# Patient Record
Sex: FEMALE | Race: WHITE | NOT HISPANIC OR LATINO
[De-identification: names, ages, dates, MRNs, and addresses within clinical notes are randomized per-mention and may not be internally consistent; named-entity substitution may affect disease eponyms.]

---

## 2017-04-08 ENCOUNTER — RX RENEWAL (OUTPATIENT)
Age: 82
End: 2017-04-08

## 2017-04-10 ENCOUNTER — APPOINTMENT (OUTPATIENT)
Dept: PHARMACY | Facility: CLINIC | Age: 82
End: 2017-04-10

## 2017-04-10 ENCOUNTER — APPOINTMENT (OUTPATIENT)
Dept: OTOLARYNGOLOGY | Facility: CLINIC | Age: 82
End: 2017-04-10

## 2017-04-20 ENCOUNTER — CLINICAL ADVICE (OUTPATIENT)
Age: 82
End: 2017-04-20

## 2017-06-16 ENCOUNTER — APPOINTMENT (OUTPATIENT)
Dept: INTERNAL MEDICINE | Facility: CLINIC | Age: 82
End: 2017-06-16

## 2017-06-16 ENCOUNTER — NON-APPOINTMENT (OUTPATIENT)
Age: 82
End: 2017-06-16

## 2017-06-16 VITALS
BODY MASS INDEX: 35.88 KG/M2 | DIASTOLIC BLOOD PRESSURE: 88 MMHG | HEART RATE: 77 BPM | SYSTOLIC BLOOD PRESSURE: 160 MMHG | WEIGHT: 195 LBS | TEMPERATURE: 97.8 F | OXYGEN SATURATION: 96 % | HEIGHT: 62 IN

## 2017-06-16 VITALS — DIASTOLIC BLOOD PRESSURE: 80 MMHG | SYSTOLIC BLOOD PRESSURE: 146 MMHG

## 2017-06-16 DIAGNOSIS — M25.519 PAIN IN UNSPECIFIED SHOULDER: ICD-10-CM

## 2017-06-16 DIAGNOSIS — F32.9 MAJOR DEPRESSIVE DISORDER, SINGLE EPISODE, UNSPECIFIED: ICD-10-CM

## 2017-06-16 LAB
CREAT SPEC-SCNC: NORMAL
GLUCOSE UR-MCNC: NORMAL
HGB UR QL STRIP.AUTO: NORMAL
KETONES UR-MCNC: NORMAL
LEUKOCYTE ESTERASE UR QL STRIP: NORMAL
NITRITE UR QL STRIP: NORMAL
PH UR STRIP: 7
PROT UR STRIP-MCNC: NORMAL
SP GR UR STRIP: 1.01

## 2017-06-26 ENCOUNTER — APPOINTMENT (OUTPATIENT)
Dept: PHARMACY | Facility: CLINIC | Age: 82
End: 2017-06-26

## 2017-07-01 LAB
25(OH)D3 SERPL-MCNC: 38.4 NG/ML
ALBUMIN SERPL ELPH-MCNC: 4.2 G/DL
ALP BLD-CCNC: 70 U/L
ALT SERPL-CCNC: 16 U/L
ANION GAP SERPL CALC-SCNC: 18 MMOL/L
AST SERPL-CCNC: 21 U/L
BASOPHILS # BLD AUTO: 0.05 K/UL
BASOPHILS NFR BLD AUTO: 0.6 %
BILIRUB SERPL-MCNC: 0.4 MG/DL
BUN SERPL-MCNC: 27 MG/DL
CALCIUM SERPL-MCNC: 9.7 MG/DL
CHLORIDE SERPL-SCNC: 104 MMOL/L
CHOLEST SERPL-MCNC: 398 MG/DL
CHOLEST/HDLC SERPL: 7.2 RATIO
CO2 SERPL-SCNC: 19 MMOL/L
CREAT SERPL-MCNC: 0.91 MG/DL
EOSINOPHIL # BLD AUTO: 0.15 K/UL
EOSINOPHIL NFR BLD AUTO: 1.9 %
GLUCOSE SERPL-MCNC: 95 MG/DL
HCT VFR BLD CALC: 44.3 %
HDLC SERPL-MCNC: 55 MG/DL
HGB BLD-MCNC: 13.8 G/DL
IMM GRANULOCYTES NFR BLD AUTO: 0 %
LDLC SERPL CALC-MCNC: 309 MG/DL
LYMPHOCYTES # BLD AUTO: 1.99 K/UL
LYMPHOCYTES NFR BLD AUTO: 25.6 %
MAN DIFF?: NORMAL
MCHC RBC-ENTMCNC: 30.1 PG
MCHC RBC-ENTMCNC: 31.2 GM/DL
MCV RBC AUTO: 96.7 FL
MONOCYTES # BLD AUTO: 0.56 K/UL
MONOCYTES NFR BLD AUTO: 7.2 %
NEUTROPHILS # BLD AUTO: 5.02 K/UL
NEUTROPHILS NFR BLD AUTO: 64.7 %
PLATELET # BLD AUTO: 264 K/UL
POTASSIUM SERPL-SCNC: 4.4 MMOL/L
PROT SERPL-MCNC: 7.6 G/DL
RBC # BLD: 4.58 M/UL
RBC # FLD: 15.1 %
SODIUM SERPL-SCNC: 141 MMOL/L
TRIGL SERPL-MCNC: 169 MG/DL
TSH SERPL-ACNC: 1.12 UIU/ML
WBC # FLD AUTO: 7.77 K/UL

## 2017-10-02 ENCOUNTER — APPOINTMENT (OUTPATIENT)
Dept: INTERNAL MEDICINE | Facility: CLINIC | Age: 82
End: 2017-10-02
Payer: MEDICARE

## 2017-10-02 PROCEDURE — G0008: CPT

## 2017-10-02 PROCEDURE — 90662 IIV NO PRSV INCREASED AG IM: CPT

## 2017-10-05 ENCOUNTER — APPOINTMENT (OUTPATIENT)
Dept: PHARMACY | Facility: CLINIC | Age: 82
End: 2017-10-05
Payer: SELF-PAY

## 2017-10-05 PROCEDURE — V5299C: CUSTOM

## 2017-12-18 ENCOUNTER — INPATIENT (INPATIENT)
Facility: HOSPITAL | Age: 82
LOS: 2 days | Discharge: NURSING, REHAB OR LONG-TERM CARE | DRG: 563 | End: 2017-12-21
Attending: HOSPITALIST | Admitting: HOSPITALIST
Payer: MEDICARE

## 2017-12-18 VITALS — RESPIRATION RATE: 19 BRPM | SYSTOLIC BLOOD PRESSURE: 156 MMHG | DIASTOLIC BLOOD PRESSURE: 74 MMHG | HEART RATE: 79 BPM

## 2017-12-18 DIAGNOSIS — M25.571 PAIN IN RIGHT ANKLE AND JOINTS OF RIGHT FOOT: ICD-10-CM

## 2017-12-18 DIAGNOSIS — R26.2 DIFFICULTY IN WALKING, NOT ELSEWHERE CLASSIFIED: ICD-10-CM
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
## 2017-12-18 DIAGNOSIS — I10 ESSENTIAL (PRIMARY) HYPERTENSION: ICD-10-CM

## 2017-12-18 DIAGNOSIS — H40.9 UNSPECIFIED GLAUCOMA: ICD-10-CM

## 2017-12-18 LAB
ALBUMIN SERPL ELPH-MCNC: 4 G/DL — SIGNIFICANT CHANGE UP (ref 3.3–5)
ALP SERPL-CCNC: 83 U/L — SIGNIFICANT CHANGE UP (ref 40–120)
ALT FLD-CCNC: 13 U/L RC — SIGNIFICANT CHANGE UP (ref 10–45)
ANION GAP SERPL CALC-SCNC: 13 MMOL/L — SIGNIFICANT CHANGE UP (ref 5–17)
APTT BLD: 40.4 SEC — HIGH (ref 27.5–37.4)
AST SERPL-CCNC: 19 U/L — SIGNIFICANT CHANGE UP (ref 10–40)
BASOPHILS # BLD AUTO: 0.1 K/UL — SIGNIFICANT CHANGE UP (ref 0–0.2)
BASOPHILS NFR BLD AUTO: 1 % — SIGNIFICANT CHANGE UP (ref 0–2)
BILIRUB SERPL-MCNC: 0.5 MG/DL — SIGNIFICANT CHANGE UP (ref 0.2–1.2)
BUN SERPL-MCNC: 27 MG/DL — HIGH (ref 7–23)
CALCIUM SERPL-MCNC: 9.6 MG/DL — SIGNIFICANT CHANGE UP (ref 8.4–10.5)
CHLORIDE SERPL-SCNC: 102 MMOL/L — SIGNIFICANT CHANGE UP (ref 96–108)
CO2 SERPL-SCNC: 26 MMOL/L — SIGNIFICANT CHANGE UP (ref 22–31)
CREAT SERPL-MCNC: 0.92 MG/DL — SIGNIFICANT CHANGE UP (ref 0.5–1.3)
EOSINOPHIL # BLD AUTO: 0.1 K/UL — SIGNIFICANT CHANGE UP (ref 0–0.5)
EOSINOPHIL NFR BLD AUTO: 1.6 % — SIGNIFICANT CHANGE UP (ref 0–6)
GLUCOSE SERPL-MCNC: 113 MG/DL — HIGH (ref 70–99)
HCT VFR BLD CALC: 45.6 % — HIGH (ref 34.5–45)
HGB BLD-MCNC: 15.2 G/DL — SIGNIFICANT CHANGE UP (ref 11.5–15.5)
INR BLD: 0.99 RATIO — SIGNIFICANT CHANGE UP (ref 0.88–1.16)
LYMPHOCYTES # BLD AUTO: 2 K/UL — SIGNIFICANT CHANGE UP (ref 1–3.3)
LYMPHOCYTES # BLD AUTO: 22 % — SIGNIFICANT CHANGE UP (ref 13–44)
MCHC RBC-ENTMCNC: 31.5 PG — SIGNIFICANT CHANGE UP (ref 27–34)
MCHC RBC-ENTMCNC: 33.3 GM/DL — SIGNIFICANT CHANGE UP (ref 32–36)
MCV RBC AUTO: 94.4 FL — SIGNIFICANT CHANGE UP (ref 80–100)
MONOCYTES # BLD AUTO: 0.9 K/UL — SIGNIFICANT CHANGE UP (ref 0–0.9)
MONOCYTES NFR BLD AUTO: 9.6 % — SIGNIFICANT CHANGE UP (ref 2–14)
NEUTROPHILS # BLD AUTO: 5.8 K/UL — SIGNIFICANT CHANGE UP (ref 1.8–7.4)
NEUTROPHILS NFR BLD AUTO: 65.8 % — SIGNIFICANT CHANGE UP (ref 43–77)
PLATELET # BLD AUTO: 234 K/UL — SIGNIFICANT CHANGE UP (ref 150–400)
POTASSIUM SERPL-MCNC: 4.2 MMOL/L — SIGNIFICANT CHANGE UP (ref 3.5–5.3)
POTASSIUM SERPL-SCNC: 4.2 MMOL/L — SIGNIFICANT CHANGE UP (ref 3.5–5.3)
PROT SERPL-MCNC: 7.8 G/DL — SIGNIFICANT CHANGE UP (ref 6–8.3)
PROTHROM AB SERPL-ACNC: 10.8 SEC — SIGNIFICANT CHANGE UP (ref 9.8–12.7)
RBC # BLD: 4.84 M/UL — SIGNIFICANT CHANGE UP (ref 3.8–5.2)
RBC # FLD: 12.1 % — SIGNIFICANT CHANGE UP (ref 10.3–14.5)
SODIUM SERPL-SCNC: 141 MMOL/L — SIGNIFICANT CHANGE UP (ref 135–145)
WBC # BLD: 8.9 K/UL — SIGNIFICANT CHANGE UP (ref 3.8–10.5)
WBC # FLD AUTO: 8.9 K/UL — SIGNIFICANT CHANGE UP (ref 3.8–10.5)

## 2017-12-18 PROCEDURE — 72170 X-RAY EXAM OF PELVIS: CPT | Mod: 26

## 2017-12-18 PROCEDURE — 73560 X-RAY EXAM OF KNEE 1 OR 2: CPT | Mod: 26,LT

## 2017-12-18 PROCEDURE — 99285 EMERGENCY DEPT VISIT HI MDM: CPT | Mod: GC

## 2017-12-18 PROCEDURE — 72125 CT NECK SPINE W/O DYE: CPT | Mod: 26

## 2017-12-18 PROCEDURE — 99223 1ST HOSP IP/OBS HIGH 75: CPT

## 2017-12-18 PROCEDURE — 73590 X-RAY EXAM OF LOWER LEG: CPT | Mod: 26,RT

## 2017-12-18 PROCEDURE — 70450 CT HEAD/BRAIN W/O DYE: CPT | Mod: 26

## 2017-12-18 PROCEDURE — 73610 X-RAY EXAM OF ANKLE: CPT | Mod: 26,LT

## 2017-12-18 RX ORDER — ASPIRIN/CALCIUM CARB/MAGNESIUM 324 MG
1 TABLET ORAL
Qty: 0 | Refills: 0 | COMMUNITY

## 2017-12-18 RX ORDER — KETOROLAC TROMETHAMINE 30 MG/ML
10 SYRINGE (ML) INJECTION EVERY 8 HOURS
Qty: 0 | Refills: 0 | Status: DISCONTINUED | OUTPATIENT
Start: 2017-12-18 | End: 2017-12-21

## 2017-12-18 RX ORDER — TAFLUPROST 0 MG/.3ML
1 SOLUTION/ DROPS OPHTHALMIC
Qty: 0 | Refills: 0 | COMMUNITY

## 2017-12-18 RX ORDER — PILOCARPINE HCL 4 %
2 DROPS OPHTHALMIC (EYE) DAILY
Qty: 0 | Refills: 0 | Status: DISCONTINUED | OUTPATIENT
Start: 2017-12-18 | End: 2017-12-18

## 2017-12-18 RX ORDER — METOPROLOL TARTRATE 50 MG
50 TABLET ORAL DAILY
Qty: 0 | Refills: 0 | Status: DISCONTINUED | OUTPATIENT
Start: 2017-12-18 | End: 2017-12-21

## 2017-12-18 RX ORDER — IBUPROFEN 200 MG
400 TABLET ORAL EVERY 6 HOURS
Qty: 0 | Refills: 0 | Status: DISCONTINUED | OUTPATIENT
Start: 2017-12-18 | End: 2017-12-21

## 2017-12-18 RX ORDER — ACETAMINOPHEN 500 MG
650 TABLET ORAL EVERY 6 HOURS
Qty: 0 | Refills: 0 | Status: DISCONTINUED | OUTPATIENT
Start: 2017-12-18 | End: 2017-12-21

## 2017-12-18 RX ORDER — DORZOLAMIDE HYDROCHLORIDE TIMOLOL MALEATE 20; 5 MG/ML; MG/ML
1 SOLUTION/ DROPS OPHTHALMIC
Qty: 0 | Refills: 0 | Status: DISCONTINUED | OUTPATIENT
Start: 2017-12-18 | End: 2017-12-21

## 2017-12-18 RX ORDER — PILOCARPINE HCL 4 %
1 DROPS OPHTHALMIC (EYE) THREE TIMES A DAY
Qty: 0 | Refills: 0 | Status: DISCONTINUED | OUTPATIENT
Start: 2017-12-18 | End: 2017-12-21

## 2017-12-18 RX ORDER — DORZOLAMIDE HYDROCHLORIDE TIMOLOL MALEATE 20; 5 MG/ML; MG/ML
2 SOLUTION/ DROPS OPHTHALMIC DAILY
Qty: 0 | Refills: 0 | Status: DISCONTINUED | OUTPATIENT
Start: 2017-12-18 | End: 2017-12-18

## 2017-12-18 RX ORDER — ENOXAPARIN SODIUM 100 MG/ML
40 INJECTION SUBCUTANEOUS EVERY 24 HOURS
Qty: 0 | Refills: 0 | Status: DISCONTINUED | OUTPATIENT
Start: 2017-12-18 | End: 2017-12-21

## 2017-12-18 RX ORDER — ASPIRIN/CALCIUM CARB/MAGNESIUM 324 MG
81 TABLET ORAL DAILY
Qty: 0 | Refills: 0 | Status: DISCONTINUED | OUTPATIENT
Start: 2017-12-18 | End: 2017-12-21

## 2017-12-18 RX ADMIN — ENOXAPARIN SODIUM 40 MILLIGRAM(S): 100 INJECTION SUBCUTANEOUS at 18:34

## 2017-12-18 RX ADMIN — Medication 1 DROP(S): at 21:39

## 2017-12-18 RX ADMIN — DORZOLAMIDE HYDROCHLORIDE TIMOLOL MALEATE 1 DROP(S): 20; 5 SOLUTION/ DROPS OPHTHALMIC at 18:35

## 2017-12-18 NOTE — ED ADULT NURSE NOTE - OBJECTIVE STATEMENT
1040 89 yr old WF brought to ER via ambulance on stretcher for further eval and tx of right ankle pain/unable to walk. s/p tripped and fell 2 pm yesterday while ambulating with walker. No LOC. denies chest pain, palp, SOB, HA or dizziness. A&Ox3. Pain with ROM right ankle +swelling.

## 2017-12-18 NOTE — ED PROVIDER NOTE - ATTENDING CONTRIBUTION TO CARE
I performed a history and physical exam of the patient and discussed their management with the fellow. reviewed the fellow's note and agree with the documented findings and plan of care. My medical decision making and observations are found above.

## 2017-12-18 NOTE — H&P ADULT - PROBLEM SELECTOR PLAN 1
Clinical history consistent with mechanical fall in bathroom while trying to pivot.   Fall precaution, PT eval Clinical history consistent with mechanical fall in bathroom while trying to pivot.   Fall precaution, PT eval.   Trauma work up neg for acute fracture or bleeding.

## 2017-12-18 NOTE — ED PROVIDER NOTE - OBJECTIVE STATEMENT
89 yo f with history of macular degeneration, arthiritis, from home walks with walker on aspirin presents after fall from standing. Patient reports being at home in the bathroom and trying to grab a rail but missing the rail, tripping and falling, twisting her ankle and hitting her head. Denies LOC. She reports that she hit her medic alert 89 yo f with history of macular degeneration, arthiritis, from home walks with walker on aspirin presents after fall from standing. Patient reports being at home in the bathroom and trying to grab a rail but missing the rail, tripping and falling, twisting her ankle and hitting her head. Denies LOC. She reports that she hit her medic alert before they brought her to the ED. She is complaining of right ankle pain/swelling. Denies neurological symptoms. Unable to bear weight after fall.

## 2017-12-18 NOTE — H&P ADULT - NSHPLABSRESULTS_GEN_ALL_CORE
15.2   8.9   )-----------( 234      ( 18 Dec 2017 11:07 )             45.6   12-18    141  |  102  |  27<H>  ----------------------------<  113<H>  4.2   |  26  |  0.92    Ca    9.6      18 Dec 2017 11:07    TPro  7.8  /  Alb  4.0  /  TBili  0.5  /  DBili  x   /  AST  19  /  ALT  13  /  AlkPhos  83  12-18  < from: Xray Ankle Complete 3 Views, Right (12.18.17 @ 11:53) >    Xray ankle:   IMPRESSION:  1.  No acute fracture or dislocation.  2.  Soft tissue swelling about the ankle, particularly overlying the   lateral malleolus.  3.  Severe osteoarthritis of the right knee.    < end of copied text >

## 2017-12-18 NOTE — ED PROVIDER NOTE - PROGRESS NOTE DETAILS
Radiography negative. Patient unable to walk. D/w hospitalist, patient to be admitted due to Inability to ambulate due to right ankle pain.

## 2017-12-18 NOTE — ED ADULT NURSE NOTE - ED STAT RN HANDOFF DETAILS 2
hand off given to oncoming RN Carlita Llanes RN. Pt awaiting dispo hand off given to oncoming RN Carlita Llanes RN. Pt awaiting bed. TBA

## 2017-12-18 NOTE — ED PROVIDER NOTE - MEDICAL DECISION MAKING DETAILS
Brian KING: 89 yo F here for fall from standing. Pt is coming from home, reports tripping and falling causing her to twist her right ankle. + hit head. No loc. Pt hit her med alert. No preceding chest pain, palpitations, headache, fever, chills, nausea, vomiting. exam: right ankle swollen, ttp lateral maleolus left knee: mild tenderness, no obvious swelling or deformity left ankle: swollen, mild diffuse ttp, pelvis stable - no tenderness a/p: s/p mechanical fall - r/o fx. Pt cannot ambulate. Will get labs, XR of chest, pelvis, left knee and ankle, right ankle, pain control and reassess.

## 2017-12-18 NOTE — H&P ADULT - PROBLEM SELECTOR PLAN 2
Imagings negative for acute fracture or bleed.  Suspect right ankle sprain/ soft issue injury.   NSAIDs as needed for pain control  PT evaL. Imagings negative for acute fracture or bleed.  Suspect right ankle sprain/ soft issue injury.   NSAIDs as needed for pain control. PT evaL.

## 2017-12-18 NOTE — H&P ADULT - HISTORY OF PRESENT ILLNESS
89 yo f with h/o OA, HTN, glaucoma, macular degeneration presented with fall at home with right ankle pain. Patient uses walker at home 89 yo f with h/o OA, HTN, glaucoma, macular degeneration presented with fall at home with right ankle pain since yesteraday. Pt was in the bathroom and was attempting to pivot around while reaching for her walker when it tipped over and she landed on to her right lower back and hip. Believe also may have twisted her right ankle as well. Patient was able to crawl out and was found by her daughter who resides with her. Pain and swelling had gotten worse since yesterday and decided to come in today due inability to ambulate. denies any dizziness or LOC. No pain from RLE while lying down. Lives at home with her daughter. no home aide. No fever or chills, cough, chest pain, palpitations, SOB. no urinary symptoms.

## 2017-12-19 ENCOUNTER — TRANSCRIPTION ENCOUNTER (OUTPATIENT)
Age: 82
End: 2017-12-19

## 2017-12-19 DIAGNOSIS — N30.90 CYSTITIS, UNSPECIFIED WITHOUT HEMATURIA: ICD-10-CM

## 2017-12-19 LAB
ANION GAP SERPL CALC-SCNC: 12 MMOL/L — SIGNIFICANT CHANGE UP (ref 5–17)
APPEARANCE UR: ABNORMAL
BACTERIA # UR AUTO: ABNORMAL /HPF
BASOPHILS # BLD AUTO: 0.04 K/UL — SIGNIFICANT CHANGE UP (ref 0–0.2)
BASOPHILS NFR BLD AUTO: 0.6 % — SIGNIFICANT CHANGE UP (ref 0–2)
BILIRUB UR-MCNC: NEGATIVE — SIGNIFICANT CHANGE UP
BUN SERPL-MCNC: 25 MG/DL — HIGH (ref 7–23)
CALCIUM SERPL-MCNC: 9.1 MG/DL — SIGNIFICANT CHANGE UP (ref 8.4–10.5)
CHLORIDE SERPL-SCNC: 106 MMOL/L — SIGNIFICANT CHANGE UP (ref 96–108)
CO2 SERPL-SCNC: 25 MMOL/L — SIGNIFICANT CHANGE UP (ref 22–31)
COLOR SPEC: YELLOW — SIGNIFICANT CHANGE UP
CREAT SERPL-MCNC: 0.72 MG/DL — SIGNIFICANT CHANGE UP (ref 0.5–1.3)
DIFF PNL FLD: NEGATIVE — SIGNIFICANT CHANGE UP
EOSINOPHIL # BLD AUTO: 0.19 K/UL — SIGNIFICANT CHANGE UP (ref 0–0.5)
EOSINOPHIL NFR BLD AUTO: 2.7 % — SIGNIFICANT CHANGE UP (ref 0–6)
GLUCOSE BLDC GLUCOMTR-MCNC: 102 MG/DL — HIGH (ref 70–99)
GLUCOSE SERPL-MCNC: 107 MG/DL — HIGH (ref 70–99)
GLUCOSE UR QL: NEGATIVE — SIGNIFICANT CHANGE UP
HCT VFR BLD CALC: 41.8 % — SIGNIFICANT CHANGE UP (ref 34.5–45)
HGB BLD-MCNC: 13.1 G/DL — SIGNIFICANT CHANGE UP (ref 11.5–15.5)
IMM GRANULOCYTES NFR BLD AUTO: 0.1 % — SIGNIFICANT CHANGE UP (ref 0–1.5)
KETONES UR-MCNC: NEGATIVE — SIGNIFICANT CHANGE UP
LEUKOCYTE ESTERASE UR-ACNC: ABNORMAL
LYMPHOCYTES # BLD AUTO: 2.73 K/UL — SIGNIFICANT CHANGE UP (ref 1–3.3)
LYMPHOCYTES # BLD AUTO: 38.7 % — SIGNIFICANT CHANGE UP (ref 13–44)
MCHC RBC-ENTMCNC: 29.7 PG — SIGNIFICANT CHANGE UP (ref 27–34)
MCHC RBC-ENTMCNC: 31.3 GM/DL — LOW (ref 32–36)
MCV RBC AUTO: 94.8 FL — SIGNIFICANT CHANGE UP (ref 80–100)
MONOCYTES # BLD AUTO: 0.78 K/UL — SIGNIFICANT CHANGE UP (ref 0–0.9)
MONOCYTES NFR BLD AUTO: 11.1 % — SIGNIFICANT CHANGE UP (ref 2–14)
NEUTROPHILS # BLD AUTO: 3.3 K/UL — SIGNIFICANT CHANGE UP (ref 1.8–7.4)
NEUTROPHILS NFR BLD AUTO: 46.8 % — SIGNIFICANT CHANGE UP (ref 43–77)
NITRITE UR-MCNC: NEGATIVE — SIGNIFICANT CHANGE UP
PH UR: 5.5 — SIGNIFICANT CHANGE UP (ref 5–8)
PLATELET # BLD AUTO: 216 K/UL — SIGNIFICANT CHANGE UP (ref 150–400)
POTASSIUM SERPL-MCNC: 3.6 MMOL/L — SIGNIFICANT CHANGE UP (ref 3.5–5.3)
POTASSIUM SERPL-SCNC: 3.6 MMOL/L — SIGNIFICANT CHANGE UP (ref 3.5–5.3)
PROT UR-MCNC: 30 MG/DL
RBC # BLD: 4.41 M/UL — SIGNIFICANT CHANGE UP (ref 3.8–5.2)
RBC # FLD: 14.2 % — SIGNIFICANT CHANGE UP (ref 10.3–14.5)
RBC CASTS # UR COMP ASSIST: SIGNIFICANT CHANGE UP /HPF (ref 0–2)
SODIUM SERPL-SCNC: 143 MMOL/L — SIGNIFICANT CHANGE UP (ref 135–145)
SP GR SPEC: 1.02 — SIGNIFICANT CHANGE UP (ref 1.01–1.02)
UROBILINOGEN FLD QL: NEGATIVE — SIGNIFICANT CHANGE UP
WBC # BLD: 7.05 K/UL — SIGNIFICANT CHANGE UP (ref 3.8–10.5)
WBC # FLD AUTO: 7.05 K/UL — SIGNIFICANT CHANGE UP (ref 3.8–10.5)
WBC UR QL: >50 /HPF (ref 0–5)

## 2017-12-19 PROCEDURE — 99232 SBSQ HOSP IP/OBS MODERATE 35: CPT

## 2017-12-19 RX ORDER — CHOLECALCIFEROL (VITAMIN D3) 125 MCG
1000 CAPSULE ORAL DAILY
Qty: 0 | Refills: 0 | Status: DISCONTINUED | OUTPATIENT
Start: 2017-12-19 | End: 2017-12-21

## 2017-12-19 RX ORDER — CEPHALEXIN 500 MG
500 CAPSULE ORAL EVERY 12 HOURS
Qty: 0 | Refills: 0 | Status: DISCONTINUED | OUTPATIENT
Start: 2017-12-19 | End: 2017-12-21

## 2017-12-19 RX ADMIN — Medication 1 DROP(S): at 05:42

## 2017-12-19 RX ADMIN — DORZOLAMIDE HYDROCHLORIDE TIMOLOL MALEATE 1 DROP(S): 20; 5 SOLUTION/ DROPS OPHTHALMIC at 05:42

## 2017-12-19 RX ADMIN — ENOXAPARIN SODIUM 40 MILLIGRAM(S): 100 INJECTION SUBCUTANEOUS at 17:34

## 2017-12-19 RX ADMIN — Medication 1 DROP(S): at 14:01

## 2017-12-19 RX ADMIN — Medication 1000 UNIT(S): at 13:59

## 2017-12-19 RX ADMIN — Medication 81 MILLIGRAM(S): at 14:03

## 2017-12-19 RX ADMIN — DORZOLAMIDE HYDROCHLORIDE TIMOLOL MALEATE 1 DROP(S): 20; 5 SOLUTION/ DROPS OPHTHALMIC at 17:40

## 2017-12-19 RX ADMIN — Medication 500 MILLIGRAM(S): at 17:34

## 2017-12-19 RX ADMIN — Medication 1 DROP(S): at 21:30

## 2017-12-19 RX ADMIN — Medication 50 MILLIGRAM(S): at 05:42

## 2017-12-19 NOTE — DISCHARGE NOTE ADULT - MEDICATION SUMMARY - MEDICATIONS TO CHANGE
I will SWITCH the dose or number of times a day I take the medications listed below when I get home from the hospital:    Cosopt PF 2%-0.5% ophthalmic solution  -- 2 drop(s) to each affected eye once a day    pilocarpine 2% ophthalmic solution  -- 3 drop(s) to each affected eye once a day

## 2017-12-19 NOTE — PHYSICAL THERAPY INITIAL EVALUATION ADULT - LIVES WITH, PROFILE
as per pt and daughter at bedside, pt lives with daughter in a private house, 3 steps to enter, no steps inside house. PTA, pt ambulatory with rolling walker (I). pt (I) with bed mobility, transfers, ambulation, dressing and feeding. pt requires supervision while getting into tub for shower, however showers (I). pt requires 1-2 person assist for stair negotiation (since 5 years ago). pt always have someone to assist with stair negotiation. pt owns rolling walker, rollator, commode, shower chair, grab bars, wheelchair (for community distances)

## 2017-12-19 NOTE — PROVIDER CONTACT NOTE (OTHER) - ASSESSMENT
per pt daughter, pt cannot use dorzolamide and pilocarpine as both contain preservatives, and will irritate patient's eyes. pt daughter brought home medications, need order for patient to use own medications. pt does not have order for home med zioptan eye drops.

## 2017-12-19 NOTE — PHYSICAL THERAPY INITIAL EVALUATION ADULT - PRECAUTIONS/LIMITATIONS, REHAB EVAL
Pain and swelling had gotten worse since yesterday and decided to come in today due inability to ambulate. CTH 12/18 (-). xray tibia/fibula Soft tissue swelling about the ankle, particularly overlying the lateral malleolus. Severe osteoarthritis of the right knee. no fx./fall precautions

## 2017-12-19 NOTE — DISCHARGE NOTE ADULT - CARE PLAN
Principal Discharge DX:	Fall, initial encounter  Secondary Diagnosis:	Cystitis Principal Discharge DX:	Fall, initial encounter  Goal:	fall precaution  Instructions for follow-up, activity and diet:	fall precaution  physical therapy  Secondary Diagnosis:	Cystitis  Instructions for follow-up, activity and diet:	complete antibiotic  Secondary Diagnosis:	Glaucoma of both eyes, unspecified glaucoma type  Instructions for follow-up, activity and diet:	continue eye drops  Secondary Diagnosis:	Essential hypertension  Instructions for follow-up, activity and diet:	continue medication

## 2017-12-19 NOTE — DISCHARGE NOTE ADULT - CARE PROVIDER_API CALL
Latricia Dalal (MD), Internal Medicine  [address] [address]  Phone: [phone]  Fax: [phone]

## 2017-12-19 NOTE — PHYSICAL THERAPY INITIAL EVALUATION ADULT - DISCHARGE DISPOSITION, PT EVAL
home w/ assist/home w/ home PT/Home with PT for functional/safety assessment, gait/endurance training, general strengthening and fall risk prevention. pt would require supervision/assist with all ADLs and functional activities upon DC.

## 2017-12-19 NOTE — PHYSICAL THERAPY INITIAL EVALUATION ADULT - GAIT DEVIATIONS NOTED, PT EVAL
decreased velocity of limb motion/decreased weight-shifting ability/decreased norberto/decreased step length/decreased stride length

## 2017-12-19 NOTE — DISCHARGE NOTE ADULT - MEDICATION SUMMARY - MEDICATIONS TO TAKE
I will START or STAY ON the medications listed below when I get home from the hospital:    acetaminophen 325 mg oral tablet  -- 2 tab(s) by mouth every 6 hours, As needed, Mild Pain (1 - 3)  -- Indication: For pain    aspirin 81 mg oral tablet  -- 1 tab(s) by mouth once a day  -- Indication: For Antiplatelet    enoxaparin  -- 40 milligram(s) subcutaneous once a day  -- Indication: For Acute right ankle pain    Toprol-XL 50 mg oral tablet, extended release  -- 1 tab(s) by mouth once a day  -- Indication: For Essential hypertension    cephalexin 500 mg oral capsule  -- 1 cap(s) by mouth every 12 hours for 3 days  -- Indication: For Antibiotic    Zioptan 0.0015% ophthalmic solution  -- 1 drop(s) to each affected eye once a day (in the evening)  -- Indication: For Glaucoma of both eyes, unspecified glaucoma type    Cosopt PF 2%-0.5% ophthalmic solution  -- 1 drop(s) to each affected eye 2 times a day  -- Indication: For Glaucoma of both eyes, unspecified glaucoma type    pilocarpine 2% ophthalmic solution  -- 1 drop(s) to each affected eye 3 times a day  -- Indication: For Glaucoma of both eyes, unspecified glaucoma type    cholecalciferol oral tablet  -- 1000 unit(s) by mouth once a day  -- Indication: For vitamin D supplement

## 2017-12-19 NOTE — PHYSICAL THERAPY INITIAL EVALUATION ADULT - PERTINENT HX OF CURRENT PROBLEM, REHAB EVAL
89yoF, hx OA, HTN, glaucoma, macular degeneration presented with fall at home with right ankle pain since yesteraday. Pt was in the bathroom and was attempting to pivot around while reaching for her walker when it tipped over and she landed on to her right lower back and hip. Believe also may have twisted her right ankle as well.

## 2017-12-19 NOTE — DISCHARGE NOTE ADULT - PLAN OF CARE
fall precaution fall precaution  physical therapy complete antibiotic continue eye drops continue medication

## 2017-12-19 NOTE — DISCHARGE NOTE ADULT - HOSPITAL COURSE
89 yo f with OA, HTN, macular generation presented with mechanical fall and right ankle pain. Imaging not consistent with fracture. Found to have uncomplicated cystitis. 89 yo f with OA, HTN, macular generation presented with mechanical fall and right ankle pain. Imaging not consistent with fracture. Found to have uncomplicated cystitis.  complete course of antibiotic with keflex fro 3 more days;  seen by physical therapy and advised rehab 89 yo f with OA, HTN, macular generation presented with mechanical fall and right ankle pain. Imaging not consistent with fracture. Found to have uncomplicated cystitis.  complete course of antibiotic with keflex for  3 more days;  seen by physical therapy and advised rehab 89 yo f with OA, HTN, macular generation presented with mechanical fall and right ankle pain. Imaging not consistent with fracture. Mechanical fall likely in the setting of gait instability, seen by physical therapy and advised rehab. Also found to have uncomplicated cystitis., to complete course of antibiotic with keflex for  3 more days. Stable for dc to rehab

## 2017-12-19 NOTE — PHYSICAL THERAPY INITIAL EVALUATION ADULT - ACTIVE RANGE OF MOTION EXAMINATION, REHAB EVAL
B shoulders limited due to arthritis/bilateral upper extremity Active ROM was WFL (within functional limits)/bilateral  lower extremity Active ROM was WFL (within functional limits)

## 2017-12-20 PROCEDURE — 99233 SBSQ HOSP IP/OBS HIGH 50: CPT

## 2017-12-20 RX ORDER — DORZOLAMIDE HYDROCHLORIDE TIMOLOL MALEATE 20; 5 MG/ML; MG/ML
1 SOLUTION/ DROPS OPHTHALMIC
Qty: 0 | Refills: 0 | COMMUNITY

## 2017-12-20 RX ORDER — PILOCARPINE HCL 4 %
3 DROPS OPHTHALMIC (EYE)
Qty: 0 | Refills: 0 | COMMUNITY

## 2017-12-20 RX ORDER — CEPHALEXIN 500 MG
1 CAPSULE ORAL
Qty: 0 | Refills: 0 | COMMUNITY
Start: 2017-12-20

## 2017-12-20 RX ORDER — ENOXAPARIN SODIUM 100 MG/ML
40 INJECTION SUBCUTANEOUS
Qty: 0 | Refills: 0 | COMMUNITY
Start: 2017-12-20

## 2017-12-20 RX ORDER — CHOLECALCIFEROL (VITAMIN D3) 125 MCG
1000 CAPSULE ORAL
Qty: 0 | Refills: 0 | COMMUNITY
Start: 2017-12-20

## 2017-12-20 RX ORDER — ACETAMINOPHEN 500 MG
2 TABLET ORAL
Qty: 0 | Refills: 0 | COMMUNITY
Start: 2017-12-20

## 2017-12-20 RX ORDER — PILOCARPINE HCL 4 %
1 DROPS OPHTHALMIC (EYE)
Qty: 0 | Refills: 0 | COMMUNITY

## 2017-12-20 RX ORDER — DORZOLAMIDE HYDROCHLORIDE TIMOLOL MALEATE 20; 5 MG/ML; MG/ML
2 SOLUTION/ DROPS OPHTHALMIC
Qty: 0 | Refills: 0 | COMMUNITY

## 2017-12-20 RX ADMIN — Medication 1 DROP(S): at 13:11

## 2017-12-20 RX ADMIN — Medication 500 MILLIGRAM(S): at 05:16

## 2017-12-20 RX ADMIN — DORZOLAMIDE HYDROCHLORIDE TIMOLOL MALEATE 1 DROP(S): 20; 5 SOLUTION/ DROPS OPHTHALMIC at 17:31

## 2017-12-20 RX ADMIN — Medication 1 DROP(S): at 05:33

## 2017-12-20 RX ADMIN — Medication 50 MILLIGRAM(S): at 05:16

## 2017-12-20 RX ADMIN — Medication 1000 UNIT(S): at 12:47

## 2017-12-20 RX ADMIN — Medication 500 MILLIGRAM(S): at 17:31

## 2017-12-20 RX ADMIN — Medication 1 DROP(S): at 21:31

## 2017-12-20 RX ADMIN — DORZOLAMIDE HYDROCHLORIDE TIMOLOL MALEATE 1 DROP(S): 20; 5 SOLUTION/ DROPS OPHTHALMIC at 05:17

## 2017-12-20 RX ADMIN — ENOXAPARIN SODIUM 40 MILLIGRAM(S): 100 INJECTION SUBCUTANEOUS at 18:12

## 2017-12-20 RX ADMIN — Medication 81 MILLIGRAM(S): at 12:47

## 2017-12-20 NOTE — PROGRESS NOTE ADULT - ATTENDING COMMENTS
Patient lives alone - medically cleared for d/c on 5 day course of antibiotics if urine culture positive.  Will d/w daughter to see if patient is safe at home alone - may need to hire aid to help with ADLs (patient with limited vision as well).  If safe for d/c home - will discharge today  Discharge time >35 minutes

## 2017-12-21 VITALS
OXYGEN SATURATION: 97 % | HEART RATE: 83 BPM | DIASTOLIC BLOOD PRESSURE: 89 MMHG | TEMPERATURE: 97 F | RESPIRATION RATE: 18 BRPM | SYSTOLIC BLOOD PRESSURE: 168 MMHG

## 2017-12-21 PROBLEM — M19.90 UNSPECIFIED OSTEOARTHRITIS, UNSPECIFIED SITE: Chronic | Status: ACTIVE | Noted: 2017-12-18

## 2017-12-21 PROCEDURE — 82962 GLUCOSE BLOOD TEST: CPT

## 2017-12-21 PROCEDURE — 85730 THROMBOPLASTIN TIME PARTIAL: CPT

## 2017-12-21 PROCEDURE — 99239 HOSP IP/OBS DSCHRG MGMT >30: CPT

## 2017-12-21 PROCEDURE — 73610 X-RAY EXAM OF ANKLE: CPT

## 2017-12-21 PROCEDURE — 87086 URINE CULTURE/COLONY COUNT: CPT

## 2017-12-21 PROCEDURE — 73590 X-RAY EXAM OF LOWER LEG: CPT

## 2017-12-21 PROCEDURE — 81001 URINALYSIS AUTO W/SCOPE: CPT

## 2017-12-21 PROCEDURE — 72125 CT NECK SPINE W/O DYE: CPT

## 2017-12-21 PROCEDURE — 72170 X-RAY EXAM OF PELVIS: CPT

## 2017-12-21 PROCEDURE — 97530 THERAPEUTIC ACTIVITIES: CPT

## 2017-12-21 PROCEDURE — 80053 COMPREHEN METABOLIC PANEL: CPT

## 2017-12-21 PROCEDURE — 97116 GAIT TRAINING THERAPY: CPT

## 2017-12-21 PROCEDURE — 97163 PT EVAL HIGH COMPLEX 45 MIN: CPT

## 2017-12-21 PROCEDURE — 85027 COMPLETE CBC AUTOMATED: CPT

## 2017-12-21 PROCEDURE — 70450 CT HEAD/BRAIN W/O DYE: CPT

## 2017-12-21 PROCEDURE — 80048 BASIC METABOLIC PNL TOTAL CA: CPT

## 2017-12-21 PROCEDURE — 87186 SC STD MICRODIL/AGAR DIL: CPT

## 2017-12-21 PROCEDURE — 85610 PROTHROMBIN TIME: CPT

## 2017-12-21 PROCEDURE — 73560 X-RAY EXAM OF KNEE 1 OR 2: CPT

## 2017-12-21 PROCEDURE — 99285 EMERGENCY DEPT VISIT HI MDM: CPT | Mod: 25

## 2017-12-21 RX ADMIN — Medication 500 MILLIGRAM(S): at 05:19

## 2017-12-21 RX ADMIN — Medication 1 DROP(S): at 13:11

## 2017-12-21 RX ADMIN — DORZOLAMIDE HYDROCHLORIDE TIMOLOL MALEATE 1 DROP(S): 20; 5 SOLUTION/ DROPS OPHTHALMIC at 05:19

## 2017-12-21 RX ADMIN — Medication 50 MILLIGRAM(S): at 05:19

## 2017-12-21 RX ADMIN — Medication 650 MILLIGRAM(S): at 13:42

## 2017-12-21 RX ADMIN — Medication 1000 UNIT(S): at 12:57

## 2017-12-21 RX ADMIN — Medication 1 DROP(S): at 05:19

## 2017-12-21 RX ADMIN — Medication 81 MILLIGRAM(S): at 12:57

## 2017-12-21 NOTE — PROGRESS NOTE ADULT - PROBLEM SELECTOR PLAN 4
Continue with Toprol XL  Check orthostatics.    Discharge planning pending PT evaluation, as early as today if cleared by PT.    Discharge Time: 35 minutes
Continue with Toprol XL
Continue with Toprol XL

## 2017-12-21 NOTE — PROGRESS NOTE ADULT - PROBLEM SELECTOR PLAN 2
Imaging negative for acute fracture or bleed.  Suspect right ankle sprain/ soft issue injury.   NSAIDs as needed for pain control. PT eval pending.
Imaging negative for acute fracture or bleed.  Suspect right ankle sprain/ soft issue injury.   tylenol as needed for pain, avoid NSAIDS given age and creatinine clearance
Imaging negative for acute fracture or bleed.  Suspect right ankle sprain/ soft issue injury.   tylenol as needed for pain, avoid NSAIDS given age and creatinine clearance

## 2017-12-21 NOTE — PROGRESS NOTE ADULT - ASSESSMENT
89 yo f with OA, HTN, macular generation presented with mechanical fall and right ankle pain. Imaging not consistent with fracture. Found to have uncomplicated cystitis.
89 yo f with OA, HTN, macular generation presented with mechanical fall and right ankle pain. Imaging not consistent with fracture. Found to have uncomplicated cystitis.
89 yo f with OA, HTN, macular generation presented with mechanical fall and right ankle pain. Imaging not consistent with fracture. Found to have uncomplicated cystitis.

## 2017-12-21 NOTE — PROGRESS NOTE ADULT - PROBLEM SELECTOR PLAN 1
Clinical history consistent with mechanical fall in bathroom while trying to pivot.   Fall precautions, PT eval.   Trauma work up neg for acute fracture or bleeding.  Add vitamin D supplementation.  Limit sedatives/narcotics.  Check orthostatics.
Clinical history consistent with mechanical fall in bathroom while trying to pivot.   Fall precautions, PT eval  Trauma work up neg for acute fracture or bleeding.  Added vitamin D supplementation.  Limit sedatives/narcotics.  Plan for discharge to [facility] today
Clinical history consistent with mechanical fall in bathroom while trying to pivot.   Fall precautions, PT eval.   Trauma work up neg for acute fracture or bleeding.  Added vitamin D supplementation.  Limit sedatives/narcotics.

## 2017-12-21 NOTE — PROGRESS NOTE ADULT - PROBLEM SELECTOR PLAN 3
May have predisposed to fall. Otherwise, asymptomatic.  -Hesitant to start macrobid or bactrim given advanced age.  -Will start keflex 500 mg PO BID x 7 day course (3 day course not approved for this medication)  -Urine culture sent, will follow-up to see if adjustment in regimen is necessary
May have predisposed to fall. Otherwise, asymptomatic.  -Hesitant to start macrobid or bactrim given advanced age.  -Will started on keflex 500 mg PO BID x 5 day course - pending urine culture  -Urine culture sent, will follow-up to see if adjustment in regimen is necessary
May have predisposed to fall. Otherwise, asymptomatic.  Urine culture with klebsiella   Complete course of keflex for total 7 days

## 2017-12-21 NOTE — PROGRESS NOTE ADULT - SUBJECTIVE AND OBJECTIVE BOX
Patient is a 89y old  Female who presents with a chief complaint of s/p fall Right ankle pain (19 Dec 2017 14:38)      SUBJECTIVE / OVERNIGHT EVENTS: No cp, sob, chills, abdominal pain, wants to be discharged     MEDICATIONS  (STANDING):  aspirin enteric coated 81 milliGRAM(s) Oral daily  cephalexin 500 milliGRAM(s) Oral every 12 hours  cholecalciferol 1000 Unit(s) Oral daily  dorzolamide 2%/timolol 0.5% Ophthalmic Solution 1 Drop(s) Both EYES two times a day  enoxaparin Injectable 40 milliGRAM(s) SubCutaneous every 24 hours  metoprolol succinate ER 50 milliGRAM(s) Oral daily  pilocarpine 2% Solution 1 Drop(s) Both EYES three times a day  Tafluprost (Zioptan) eye drops 1 Drop(s) 1 Drop(s) Both EYES at bedtime    MEDICATIONS  (PRN):  acetaminophen   Tablet 650 milliGRAM(s) Oral every 6 hours PRN For Temp greater than 38 C (100.4 F)  acetaminophen   Tablet. 650 milliGRAM(s) Oral every 6 hours PRN Mild Pain (1 - 3)  ibuprofen  Tablet 400 milliGRAM(s) Oral every 6 hours PRN moderate pain  ketorolac 10 milliGRAM(s) Oral every 8 hours PRN Severe Pain (7 - 10)        CAPILLARY BLOOD GLUCOSE        I&O's Summary    20 Dec 2017 07:01  -  21 Dec 2017 07:00  --------------------------------------------------------  IN: 240 mL / OUT: 650 mL / NET: -410 mL    21 Dec 2017 07:01  -  21 Dec 2017 13:26  --------------------------------------------------------  IN: 240 mL / OUT: 0 mL / NET: 240 mL        PHYSICAL EXAM:  GENERAL: NAD, well-developed  HEAD:  Atraumatic, Normocephalic  EYES: EOMI, PERRLA, conjunctiva and sclera clear  NECK: Supple, No JVD  CHEST/LUNG: Clear to auscultation bilaterally; No wheeze  HEART: Regular rate and rhythm; S1S2  ABDOMEN: Soft, Nontender, Nondistended; Bowel sounds present  EXTREMITIES:  2+ Peripheral Pulses, No clubbing, cyanosis, or edema  PSYCH: AAOx3  SKIN: No rashes or lesions    LABS:                    RADIOLOGY & ADDITIONAL TESTS:    Imaging Personally Reviewed:    Consultant(s) Notes Reviewed:      Care Discussed with Consultants/Other Providers:
Patient is a 89y old  Female who presents with a chief complaint of s/p fall Right ankle pain (19 Dec 2017 14:38)      SUBJECTIVE / OVERNIGHT EVENTS: Patient seen and examined at bedside - patient feeling better, ankle pain has resolved. Patient says she is ok to go home today.     ROS:  All other review of systems negative    Allergies    No Known Allergies    Intolerances        MEDICATIONS  (STANDING):  aspirin enteric coated 81 milliGRAM(s) Oral daily  cephalexin 500 milliGRAM(s) Oral every 12 hours  cholecalciferol 1000 Unit(s) Oral daily  dorzolamide 2%/timolol 0.5% Ophthalmic Solution 1 Drop(s) Both EYES two times a day  enoxaparin Injectable 40 milliGRAM(s) SubCutaneous every 24 hours  metoprolol succinate ER 50 milliGRAM(s) Oral daily  pilocarpine 2% Solution 1 Drop(s) Both EYES three times a day  Tafluprost (Zioptan) eye drops 1 Drop(s) 1 Drop(s) Both EYES at bedtime    MEDICATIONS  (PRN):  acetaminophen   Tablet 650 milliGRAM(s) Oral every 6 hours PRN For Temp greater than 38 C (100.4 F)  acetaminophen   Tablet. 650 milliGRAM(s) Oral every 6 hours PRN Mild Pain (1 - 3)  ibuprofen  Tablet 400 milliGRAM(s) Oral every 6 hours PRN moderate pain  ketorolac 10 milliGRAM(s) Oral every 8 hours PRN Severe Pain (7 - 10)      Vital Signs Last 24 Hrs  T(C): 36.7 (20 Dec 2017 04:44), Max: 36.7 (20 Dec 2017 04:44)  T(F): 98.1 (20 Dec 2017 04:44), Max: 98.1 (20 Dec 2017 04:44)  HR: 73 (20 Dec 2017 04:44) (71 - 78)  BP: 137/77 (20 Dec 2017 04:44) (137/77 - 144/85)  BP(mean): --  RR: 18 (20 Dec 2017 04:44) (18 - 18)  SpO2: 94% (20 Dec 2017 04:44) (93% - 96%)  CAPILLARY BLOOD GLUCOSE      POCT Blood Glucose.: 102 mg/dL (19 Dec 2017 21:10)    I&O's Summary    19 Dec 2017 07:01  -  20 Dec 2017 07:00  --------------------------------------------------------  IN: 580 mL / OUT: 100 mL / NET: 480 mL        PHYSICAL EXAM:  GENERAL: NAD, well-developed  HEAD:  Atraumatic, Normocephalic  EYES: EOMI, PERRLA, conjunctiva and sclera clear  NECK: Supple, No JVD  CHEST/LUNG: Clear to auscultation bilaterally; No wheeze  HEART: Regular rate and rhythm; No murmurs, rubs, or gallops  ABDOMEN: Soft, Nontender, Nondistended; Bowel sounds present  EXTREMITIES:  2+ Peripheral Pulses, No clubbing, cyanosis, or edema  NEUROLOGY: AAOx3, non-focal  PSYCH: calm  SKIN: Trace b/l LE edema, chronic in appearance. No clubbing  MSK: Right ankle with FROM on passive flexion/extension without pain. Patient able to get up from seated position without assistance twice without pain in ankle. Left shin with small area of eschar and surrounding redness, non-tender.    LABS:                        13.1   7.05  )-----------( 216      ( 19 Dec 2017 08:46 )             41.8     12-    143  |  106  |  25<H>  ----------------------------<  107<H>  3.6   |  25  |  0.72    Ca    9.1      19 Dec 2017 08:45    TPro  7.8  /  Alb  4.0  /  TBili  0.5  /  DBili  x   /  AST  19  /  ALT  13  /  AlkPhos  83  12-18    PT/INR - ( 18 Dec 2017 11:07 )   PT: 10.8 sec;   INR: 0.99 ratio         PTT - ( 18 Dec 2017 11:07 )  PTT:40.4 sec      Urinalysis Basic - ( 19 Dec 2017 10:55 )    Color: Yellow / Appearance: SL Turbid / S.025 / pH: x  Gluc: x / Ketone: Negative  / Bili: Negative / Urobili: Negative   Blood: x / Protein: 30 mg/dL / Nitrite: Negative   Leuk Esterase: Large / RBC: 0-2 /HPF / WBC >50 /HPF   Sq Epi: x / Non Sq Epi: x / Bacteria: Many /HPF        Case Discussed with Family:
[facility] Division of Hospital Medicine  Woo Perera MD  Pager ([facility]MAN[facility], 2O-5P): [phone]  Other Times:  [phone]    Patient is a 89y old  Female who presents with a chief complaint of s/p fall Right ankle pain (18 Dec 2017 19:25)      SUBJECTIVE / OVERNIGHT EVENTS: Feels OK. Still reports R ankle pain, but feels better than yesterday. No left ankle/foot pain. No fever/chills. Denies dysuria. Is not really interested in working with physical therapy and wants to go home.    MEDICATIONS  (STANDING):  aspirin enteric coated 81 milliGRAM(s) Oral daily  cephalexin 500 milliGRAM(s) Oral every 12 hours  dorzolamide 2%/timolol 0.5% Ophthalmic Solution 1 Drop(s) Both EYES two times a day  enoxaparin Injectable 40 milliGRAM(s) SubCutaneous every 24 hours  metoprolol succinate ER 50 milliGRAM(s) Oral daily  pilocarpine 2% Solution 1 Drop(s) Both EYES three times a day  Tafluprost (Zioptan) eye drops 1 Drop(s) 1 Drop(s) Both EYES at bedtime    MEDICATIONS  (PRN):  acetaminophen   Tablet 650 milliGRAM(s) Oral every 6 hours PRN For Temp greater than 38 C (100.4 F)  acetaminophen   Tablet. 650 milliGRAM(s) Oral every 6 hours PRN Mild Pain (1 - 3)  ibuprofen  Tablet 400 milliGRAM(s) Oral every 6 hours PRN moderate pain  ketorolac 10 milliGRAM(s) Oral every 8 hours PRN Severe Pain (7 - 10)      CAPILLARY BLOOD GLUCOSE        I&O's Summary    18 Dec 2017 07:01  -  19 Dec 2017 07:00  --------------------------------------------------------  IN: 120 mL / OUT: 530 mL / NET: -410 mL        PHYSICAL EXAM:  Vital Signs Last 24 Hrs  T(C): 36.5 (19 Dec 2017 11:39), Max: 36.8 (18 Dec 2017 13:15)  T(F): 97.7 (19 Dec 2017 11:39), Max: 98.3 (18 Dec 2017 13:15)  HR: 71 (19 Dec 2017 11:39) (60 - 83)  BP: 141/81 (19 Dec 2017 11:39) (122/69 - 182/94)  BP(mean): 111 (18 Dec 2017 16:34) (111 - 111)  RR: 18 (19 Dec 2017 11:39) (18 - 20)  SpO2: 96% (19 Dec 2017 11:39) (92% - 98%)  GENERAL: NAD, well-developed. Smell of foul urine noted.  HEAD:  Atraumatic, Normocephalic  EYES: EOMI, PERRLA, conjunctiva and sclera clear  NECK: Supple, No JVD  CHEST/LUNG: Clear to auscultation bilaterally; No wheeze  HEART: Regular rate and rhythm; No murmurs, rubs, or gallops.   ABDOMEN: Soft, Nontender, Nondistended; Bowel sounds present. No suprapubic tenderness.  EXTREMITIES:  Trace b/l LE edema, chronic in appearance. No clubbing  MSK: Right ankle with FROM on passive flexion/extension without pain. Patient able to get up from seated position without assistance twice without pain in ankle. Left shin with small area of eschar and surrounding redness, non-tender.  PSYCH: AAOx3  NEUROLOGY: non-focal      LABS:                        13.1   7.05  )-----------( 216      ( 19 Dec 2017 08:46 )             41.8     12-    143  |  106  |  25<H>  ----------------------------<  107<H>  3.6   |  25  |  0.72    Ca    9.1      19 Dec 2017 08:45    TPro  7.8  /  Alb  4.0  /  TBili  0.5  /  DBili  x   /  AST  19  /  ALT  13  /  AlkPhos  83  12-18    PT/INR - ( 18 Dec 2017 11:07 )   PT: 10.8 sec;   INR: 0.99 ratio         PTT - ( 18 Dec 2017 11:07 )  PTT:40.4 sec      Urinalysis Basic - ( 19 Dec 2017 10:55 )    Color: Yellow / Appearance: SL Turbid / S.025 / pH: x  Gluc: x / Ketone: Negative  / Bili: Negative / Urobili: Negative   Blood: x / Protein: 30 mg/dL / Nitrite: Negative   Leuk Esterase: Large / RBC: 0-2 /HPF / WBC >50 /HPF   Sq Epi: x / Non Sq Epi: x / Bacteria: Many /HPF    RADIOLOGY & ADDITIONAL TESTS:    Imaging Personally Reviewed:    Consultant(s) Notes Reviewed:      Care Discussed with Consultants/Other Providers:

## 2017-12-21 NOTE — PROGRESS NOTE ADULT - PROBLEM SELECTOR PROBLEM 5
Glaucoma of both eyes, unspecified glaucoma type

## 2017-12-27 ENCOUNTER — APPOINTMENT (OUTPATIENT)
Dept: INTERNAL MEDICINE | Facility: CLINIC | Age: 82
End: 2017-12-27

## 2018-02-01 ENCOUNTER — APPOINTMENT (OUTPATIENT)
Dept: PHARMACY | Facility: CLINIC | Age: 83
End: 2018-02-01
Payer: SELF-PAY

## 2018-02-01 PROCEDURE — V5266B: CUSTOM

## 2018-03-08 ENCOUNTER — APPOINTMENT (OUTPATIENT)
Dept: INTERNAL MEDICINE | Facility: CLINIC | Age: 83
End: 2018-03-08
Payer: MEDICARE

## 2018-03-08 VITALS
TEMPERATURE: 97.9 F | HEART RATE: 74 BPM | HEIGHT: 62 IN | BODY MASS INDEX: 34.41 KG/M2 | WEIGHT: 187 LBS | DIASTOLIC BLOOD PRESSURE: 80 MMHG | SYSTOLIC BLOOD PRESSURE: 130 MMHG | OXYGEN SATURATION: 97 %

## 2018-03-08 DIAGNOSIS — L85.3 XEROSIS CUTIS: ICD-10-CM

## 2018-03-08 PROCEDURE — 99213 OFFICE O/P EST LOW 20 MIN: CPT

## 2018-03-08 RX ORDER — TRAMADOL HYDROCHLORIDE 50 MG/1
50 TABLET, COATED ORAL TWICE DAILY
Qty: 60 | Refills: 3 | Status: DISCONTINUED | COMMUNITY
Start: 2017-06-16 | End: 2018-03-08

## 2018-04-17 ENCOUNTER — APPOINTMENT (OUTPATIENT)
Dept: PHARMACY | Facility: CLINIC | Age: 83
End: 2018-04-17
Payer: SELF-PAY

## 2018-04-17 ENCOUNTER — APPOINTMENT (OUTPATIENT)
Dept: OTOLARYNGOLOGY | Facility: CLINIC | Age: 83
End: 2018-04-17
Payer: MEDICARE

## 2018-04-17 VITALS — HEIGHT: 62 IN | WEIGHT: 187 LBS | BODY MASS INDEX: 34.41 KG/M2

## 2018-04-17 DIAGNOSIS — H90.3 SENSORINEURAL HEARING LOSS, BILATERAL: ICD-10-CM

## 2018-04-17 PROCEDURE — V5299A: CUSTOM | Mod: NC

## 2018-04-17 PROCEDURE — 99214 OFFICE O/P EST MOD 30 MIN: CPT | Mod: 25

## 2018-04-17 PROCEDURE — 69210 REMOVE IMPACTED EAR WAX UNI: CPT

## 2018-05-24 ENCOUNTER — APPOINTMENT (OUTPATIENT)
Dept: OTOLARYNGOLOGY | Facility: CLINIC | Age: 83
End: 2018-05-24

## 2018-06-29 ENCOUNTER — LABORATORY RESULT (OUTPATIENT)
Age: 83
End: 2018-06-29

## 2018-06-29 ENCOUNTER — APPOINTMENT (OUTPATIENT)
Dept: INTERNAL MEDICINE | Facility: CLINIC | Age: 83
End: 2018-06-29
Payer: MEDICARE

## 2018-06-29 ENCOUNTER — NON-APPOINTMENT (OUTPATIENT)
Age: 83
End: 2018-06-29

## 2018-06-29 VITALS
OXYGEN SATURATION: 95 % | SYSTOLIC BLOOD PRESSURE: 126 MMHG | WEIGHT: 183 LBS | TEMPERATURE: 97.7 F | DIASTOLIC BLOOD PRESSURE: 80 MMHG | HEART RATE: 60 BPM | HEIGHT: 62 IN | BODY MASS INDEX: 33.68 KG/M2

## 2018-06-29 DIAGNOSIS — Z87.09 PERSONAL HISTORY OF OTHER DISEASES OF THE RESPIRATORY SYSTEM: ICD-10-CM
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
## 2018-06-29 DIAGNOSIS — H61.23 IMPACTED CERUMEN, BILATERAL: ICD-10-CM
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
## 2018-06-29 DIAGNOSIS — I10 ESSENTIAL (PRIMARY) HYPERTENSION: ICD-10-CM

## 2018-06-29 DIAGNOSIS — E78.00 PURE HYPERCHOLESTEROLEMIA, UNSPECIFIED: ICD-10-CM

## 2018-06-29 DIAGNOSIS — Z00.00 ENCOUNTER FOR GENERAL ADULT MEDICAL EXAMINATION W/OUT ABNORMAL FINDINGS: ICD-10-CM

## 2018-06-29 DIAGNOSIS — I35.0 NONRHEUMATIC AORTIC (VALVE) STENOSIS: ICD-10-CM

## 2018-06-29 DIAGNOSIS — F43.9 REACTION TO SEVERE STRESS, UNSPECIFIED: ICD-10-CM

## 2018-06-29 DIAGNOSIS — M85.80 OTHER SPECIFIED DISORDERS OF BONE DENSITY AND STRUCTURE, UNSPECIFIED SITE: ICD-10-CM

## 2018-06-29 DIAGNOSIS — Z71.89 OTHER SPECIFIED COUNSELING: ICD-10-CM

## 2018-06-29 DIAGNOSIS — R82.90 UNSPECIFIED ABNORMAL FINDINGS IN URINE: ICD-10-CM

## 2018-06-29 DIAGNOSIS — E55.9 VITAMIN D DEFICIENCY, UNSPECIFIED: ICD-10-CM

## 2018-06-29 DIAGNOSIS — Z13.89 ENCOUNTER FOR SCREENING FOR OTHER DISORDER: ICD-10-CM

## 2018-06-29 DIAGNOSIS — Z92.29 PERSONAL HISTORY OF OTHER DRUG THERAPY: ICD-10-CM

## 2018-06-29 DIAGNOSIS — M19.90 UNSPECIFIED OSTEOARTHRITIS, UNSPECIFIED SITE: ICD-10-CM

## 2018-06-29 PROCEDURE — G0439: CPT

## 2018-06-29 PROCEDURE — G0444 DEPRESSION SCREEN ANNUAL: CPT | Mod: 59

## 2018-06-29 PROCEDURE — 99214 OFFICE O/P EST MOD 30 MIN: CPT | Mod: 25

## 2018-06-29 PROCEDURE — 93000 ELECTROCARDIOGRAM COMPLETE: CPT | Mod: 59

## 2018-06-29 PROCEDURE — 36415 COLL VENOUS BLD VENIPUNCTURE: CPT

## 2018-06-29 RX ORDER — FLUTICASONE PROPIONATE 50 UG/1
50 SPRAY, METERED NASAL
Qty: 1 | Refills: 1 | Status: DISCONTINUED | COMMUNITY
Start: 2018-03-08 | End: 2018-06-29

## 2018-07-01 PROBLEM — Z71.89 COUNSELING REGARDING END OF LIFE DECISION MAKING: Status: RESOLVED | Noted: 2017-06-19 | Resolved: 2018-07-01

## 2018-07-01 PROBLEM — Z87.09 HISTORY OF POSTNASAL DRIP: Status: RESOLVED | Noted: 2018-03-08 | Resolved: 2018-07-01

## 2018-07-01 PROBLEM — H61.23 BILATERAL IMPACTED CERUMEN: Status: RESOLVED | Noted: 2018-04-17 | Resolved: 2018-07-01

## 2018-07-01 PROBLEM — Z13.89 DEPRESSION SCREENING: Status: ACTIVE | Noted: 2018-07-01

## 2018-07-01 NOTE — ASSESSMENT
[FreeTextEntry1] : She refuses the 'Shingrix vaccine\par She does not want to consider medical marijuana for her pain.\par She has persistant left foot drop\par She has failed a trial of tramadol and NSAIDs. She has seen  orthopedics and rheumatology. She wants to stay in her home. She and her daughter were given a referral to care coordinator to assist with any issues and safety concerns.\par Her blood pressure is controlled.  Blood work was sent to evaluate her lipids.  She has no symptoms from her aortic stenosis\par Advanced directives were reviewed.  Daughter states patient has a MOLST form.   Patient desires no intubation resuscitation\par Screening blood work was sent

## 2018-07-01 NOTE — PHYSICAL EXAM
[No Acute Distress] : no acute distress [Well Nourished] : well nourished [Well Developed] : well developed [Well-Appearing] : well-appearing [Normal Sclera/Conjunctiva] : normal sclera/conjunctiva [PERRL] : pupils equal round and reactive to light [EOMI] : extraocular movements intact [Normal Outer Ear/Nose] : the outer ears and nose were normal in appearance [Normal Oropharynx] : the oropharynx was normal [No JVD] : no jugular venous distention [No Lymphadenopathy] : no lymphadenopathy [Thyroid Normal, No Nodules] : the thyroid was normal and there were no nodules present [No Respiratory Distress] : no respiratory distress  [Clear to Auscultation] : lungs were clear to auscultation bilaterally [No Accessory Muscle Use] : no accessory muscle use [Normal Percussion] : the chest was normal to percussion [Normal Rate] : normal rate  [Normal S1, S2] : normal S1 and S2 [No Murmur] : no murmur heard [No Carotid Bruits] : no carotid bruits [No Abdominal Bruit] : a ~M bruit was not heard ~T in the abdomen [No Varicosities] : no varicosities [Pedal Pulses Present] : the pedal pulses are present [No Edema] : there was no peripheral edema [No Extremity Clubbing/Cyanosis] : no extremity clubbing/cyanosis [No Palpable Aorta] : no palpable aorta [Normal Appearance] : normal in appearance [No Nipple Discharge] : no nipple discharge [No Axillary Lymphadenopathy] : no axillary lymphadenopathy [Soft] : abdomen soft [Non Tender] : non-tender [Non-distended] : non-distended [No Masses] : no abdominal mass palpated [No HSM] : no HSM [Normal Bowel Sounds] : normal bowel sounds [Normal Supraclavicular Nodes] : no supraclavicular lymphadenopathy [Normal Axillary Nodes] : no axillary lymphadenopathy [Normal Posterior Cervical Nodes] : no posterior cervical lymphadenopathy [Normal Anterior Cervical Nodes] : no anterior cervical lymphadenopathy [Normal Inguinal Nodes] : no inguinal lymphadenopathy [No CVA Tenderness] : no CVA  tenderness [No Spinal Tenderness] : no spinal tenderness [No Joint Swelling] : no joint swelling [Grossly Normal Strength/Tone] : grossly normal strength/tone [No Rash] : no rash [Normal Gait] : normal gait [Coordination Grossly Intact] : coordination grossly intact [Speech Grossly Normal] : speech grossly normal [Memory Grossly Normal] : memory grossly normal [Normal Affect] : the affect was normal [Alert and Oriented x3] : oriented to person, place, and time [Normal Mood] : the mood was normal [Normal Insight/Judgement] : insight and judgment were intact [No Skin Lesions] : no skin lesions [de-identified] : Overweight [de-identified] : Left TM blocked by cerumen [de-identified] : frequent extra systoles [de-identified] : Can only lift left shoulder about 30 degrees  due to pain. Can only flex hips 30 degrees due to pain.  Knees are not hot red or swollen.  In wheelchair [de-identified] : left foot drop as in past

## 2018-07-01 NOTE — HEALTH RISK ASSESSMENT
[Transportation] : transportation [Financial] : financial [With Family] : lives with family [Retired] : retired [High School] : high school [] :  [Feels Safe at Home] : Feels safe at home [Some assistance needed] : transferring [Independent] : managing medications [Full assistance needed] : managing finances [Reports changes in hearing] : Reports changes in hearing [Reports changes in vision] : Reports changes in vision [Smoke Detector] : smoke detector [Carbon Monoxide Detector] : carbon monoxide detector [Guns at Home] : guns at home [Sunscreen] : uses sunscreen [Discussed at today's visit] : Advance Directives Discussed at today's visit [No changes since last discussed ___] : No changes since last discussed  [unfilled] [Designated Healthcare Proxy] : Designated healthcare proxy [Name: ___] : Health Care Proxy's Name: [unfilled]  [Relationship: ___] : Relationship: [unfilled] [Comfort care only] : comfort care only [DNR] : DNR [DNI] : DNI [Any fall with injury in past year] : Patient reported fall with injury in the past year [1] : 1) Little interest or pleasure doing things for several days (1) [0] : 2) Feeling down, depressed, or hopeless: Not at all (0) [# Of Children ___] : has [unfilled] children [] : No [[id number]] : 1 [Change in mental status noted] : No change in mental status noted [Language] : denies difficulty with language [Behavior] : denies difficulty with behavior [Learning/Retaining New Information] : denies difficulty learning/retaining new information [Handling Complex Tasks] : denies difficulty handling complex tasks [Reasoning] : denies difficulty with reasoning [Spatial Ability and Orientation] : denies difficulty with spatial ability and orientation [Sexually Active] : not sexually active [High Risk Behavior] : no high risk behavior [Reports changes in dental health] : Reports no changes in dental health [Seat Belt] : does not use seat belt [MammogramComments] : defers due to age  [PapSmearComments] : defers due to age [ColonoscopyComments] : defers due to age [de-identified] : blind left eye, diminished on right [de-identified] : in a safe [FreeTextEntry4] : Has MOLST form at home

## 2018-07-01 NOTE — HISTORY OF PRESENT ILLNESS
[Family Member] : family member [FreeTextEntry1] : Annual wellness visit\par OA knees and spine \par Hypertension\par Hyperlipidemia [de-identified] : She is here with her daughter Radha Alberts. She has a bed to chair existence due to her inability to walk. She feels her knee might give way and she has back pain. She has a commode at night to urinate. She has occasional urge incontinence. She walks to the shower through several rooms in the house with a  walker. She only showers when her daughter is  home. She showers and dresses herself. Her daughter has to make her meals and do laundry.  she has had no fall.   She sees cardiology .  She sees the eye doctor . She is blind in her left eye.   Her right eye has  much diminished vision.   She cannot watch TV or read. She listens to  TV. She is admittedly depressed due to her disabilities and she is lonely as family members and friends have . She cannot get out of her house without assistance of several  to  take her in the wheelchair. Daughter states they cannot put a ramp  in her house .  She sleeps well and eats healthy.\par She has no cardiopulmonary complaints.\par She doesn't want to go to a senior center

## 2018-07-17 LAB
25(OH)D3 SERPL-MCNC: 24.6 NG/ML
ALBUMIN SERPL ELPH-MCNC: 4.2 G/DL
ALP BLD-CCNC: 67 U/L
ALT SERPL-CCNC: 13 U/L
ANION GAP SERPL CALC-SCNC: 14 MMOL/L
APPEARANCE: CLEAR
AST SERPL-CCNC: 16 U/L
BACTERIA: NEGATIVE
BASOPHILS # BLD AUTO: 0 K/UL
BASOPHILS NFR BLD AUTO: 0 %
BILIRUB SERPL-MCNC: 0.5 MG/DL
BILIRUBIN URINE: NEGATIVE
BLOOD URINE: NEGATIVE
BUN SERPL-MCNC: 27 MG/DL
CALCIUM SERPL-MCNC: 10 MG/DL
CHLORIDE SERPL-SCNC: 104 MMOL/L
CHOLEST SERPL-MCNC: 268 MG/DL
CHOLEST/HDLC SERPL: 5.1 RATIO
CO2 SERPL-SCNC: 23 MMOL/L
COLOR: YELLOW
CREAT SERPL-MCNC: 0.97 MG/DL
EOSINOPHIL # BLD AUTO: 0 K/UL
EOSINOPHIL NFR BLD AUTO: 0 %
GLUCOSE QUALITATIVE U: NEGATIVE MG/DL
GLUCOSE SERPL-MCNC: 98 MG/DL
HCT VFR BLD CALC: 44.6 %
HDLC SERPL-MCNC: 53 MG/DL
HGB BLD-MCNC: 13.6 G/DL
HYALINE CASTS: 1 /LPF
KETONES URINE: NEGATIVE
LDLC SERPL CALC-MCNC: 183 MG/DL
LEUKOCYTE ESTERASE URINE: ABNORMAL
LYMPHOCYTES # BLD AUTO: 2.31 K/UL
LYMPHOCYTES NFR BLD AUTO: 28.4 %
MAN DIFF?: NORMAL
MCHC RBC-ENTMCNC: 28.8 PG
MCHC RBC-ENTMCNC: 30.5 GM/DL
MCV RBC AUTO: 94.5 FL
MICROSCOPIC-UA: NORMAL
MONOCYTES # BLD AUTO: 0.99 K/UL
MONOCYTES NFR BLD AUTO: 12.1 %
NEUTROPHILS # BLD AUTO: 4.85 K/UL
NEUTROPHILS NFR BLD AUTO: 58.6 %
NITRITE URINE: NEGATIVE
PH URINE: 6
PLATELET # BLD AUTO: 259 K/UL
POTASSIUM SERPL-SCNC: 4.2 MMOL/L
PROT SERPL-MCNC: 7.6 G/DL
PROTEIN URINE: NEGATIVE MG/DL
RBC # BLD: 4.72 M/UL
RBC # FLD: 15.1 %
RED BLOOD CELLS URINE: 2 /HPF
SODIUM SERPL-SCNC: 141 MMOL/L
SPECIFIC GRAVITY URINE: 1.02
SQUAMOUS EPITHELIAL CELLS: 8 /HPF
T3RU NFR SERPL: 0.74 INDEX
T4 SERPL-MCNC: 8.4 UG/DL
TRIGL SERPL-MCNC: 158 MG/DL
TSH SERPL-ACNC: 2.12 UIU/ML
UROBILINOGEN URINE: NEGATIVE MG/DL
WBC # FLD AUTO: 8.15 K/UL
WHITE BLOOD CELLS URINE: 11 /HPF

## 2018-10-31 ENCOUNTER — APPOINTMENT (OUTPATIENT)
Dept: INTERNAL MEDICINE | Facility: CLINIC | Age: 83
End: 2018-10-31
Payer: MEDICARE

## 2018-10-31 DIAGNOSIS — Z23 ENCOUNTER FOR IMMUNIZATION: ICD-10-CM

## 2018-10-31 PROCEDURE — G0008: CPT

## 2018-10-31 PROCEDURE — 90662 IIV NO PRSV INCREASED AG IM: CPT

## 2018-12-06 ENCOUNTER — MEDICATION RENEWAL (OUTPATIENT)
Age: 83
End: 2018-12-06

## 2019-01-06 ENCOUNTER — INPATIENT (INPATIENT)
Facility: HOSPITAL | Age: 84
LOS: 3 days | Discharge: HOME HEALTH | End: 2019-01-10
Attending: HOSPITALIST | Admitting: HOSPITALIST
Payer: MEDICARE

## 2019-01-06 VITALS
RESPIRATION RATE: 16 BRPM | HEART RATE: 88 BPM | DIASTOLIC BLOOD PRESSURE: 97 MMHG | TEMPERATURE: 98 F | SYSTOLIC BLOOD PRESSURE: 167 MMHG | OXYGEN SATURATION: 100 %

## 2019-01-06 DIAGNOSIS — Z90.721 ACQUIRED ABSENCE OF OVARIES, UNILATERAL: Chronic | ICD-10-CM

## 2019-01-06 DIAGNOSIS — R55 SYNCOPE AND COLLAPSE: ICD-10-CM

## 2019-01-06 DIAGNOSIS — Z29.9 ENCOUNTER FOR PROPHYLACTIC MEASURES, UNSPECIFIED: ICD-10-CM

## 2019-01-06 DIAGNOSIS — E86.0 DEHYDRATION: ICD-10-CM

## 2019-01-06 DIAGNOSIS — I16.0 HYPERTENSIVE URGENCY: ICD-10-CM

## 2019-01-06 LAB
ALBUMIN SERPL ELPH-MCNC: 3.7 G/DL — SIGNIFICANT CHANGE UP (ref 3.3–5)
ALBUMIN SERPL ELPH-MCNC: 3.8 G/DL — SIGNIFICANT CHANGE UP (ref 3.3–5)
ALP SERPL-CCNC: 78 U/L — SIGNIFICANT CHANGE UP (ref 40–120)
ALP SERPL-CCNC: 81 U/L — SIGNIFICANT CHANGE UP (ref 40–120)
ALT FLD-CCNC: 10 U/L — SIGNIFICANT CHANGE UP (ref 4–33)
ALT FLD-CCNC: 9 U/L — SIGNIFICANT CHANGE UP (ref 4–33)
APPEARANCE UR: SIGNIFICANT CHANGE UP
AST SERPL-CCNC: 16 U/L — SIGNIFICANT CHANGE UP (ref 4–32)
AST SERPL-CCNC: 18 U/L — SIGNIFICANT CHANGE UP (ref 4–32)
BACTERIA # UR AUTO: SIGNIFICANT CHANGE UP
BASE EXCESS BLDV CALC-SCNC: 2.6 MMOL/L — SIGNIFICANT CHANGE UP
BASOPHILS # BLD AUTO: 0.06 K/UL — SIGNIFICANT CHANGE UP (ref 0–0.2)
BASOPHILS NFR BLD AUTO: 0.6 % — SIGNIFICANT CHANGE UP (ref 0–2)
BILIRUB SERPL-MCNC: 0.3 MG/DL — SIGNIFICANT CHANGE UP (ref 0.2–1.2)
BILIRUB SERPL-MCNC: 0.3 MG/DL — SIGNIFICANT CHANGE UP (ref 0.2–1.2)
BILIRUB UR-MCNC: NEGATIVE — SIGNIFICANT CHANGE UP
BLOOD GAS VENOUS - CREATININE: 0.87 MG/DL — SIGNIFICANT CHANGE UP (ref 0.5–1.3)
BLOOD UR QL VISUAL: NEGATIVE — SIGNIFICANT CHANGE UP
BUN SERPL-MCNC: 26 MG/DL — HIGH (ref 7–23)
BUN SERPL-MCNC: 26 MG/DL — HIGH (ref 7–23)
CALCIUM SERPL-MCNC: 9.3 MG/DL — SIGNIFICANT CHANGE UP (ref 8.4–10.5)
CALCIUM SERPL-MCNC: 9.7 MG/DL — SIGNIFICANT CHANGE UP (ref 8.4–10.5)
CHLORIDE BLDV-SCNC: 103 MMOL/L — SIGNIFICANT CHANGE UP (ref 96–108)
CHLORIDE SERPL-SCNC: 99 MMOL/L — SIGNIFICANT CHANGE UP (ref 98–107)
CHLORIDE SERPL-SCNC: 99 MMOL/L — SIGNIFICANT CHANGE UP (ref 98–107)
CK MB BLD-MCNC: 2.89 NG/ML — SIGNIFICANT CHANGE UP (ref 1–4.7)
CK SERPL-CCNC: 48 U/L — SIGNIFICANT CHANGE UP (ref 25–170)
CO2 SERPL-SCNC: 20 MMOL/L — LOW (ref 22–31)
CO2 SERPL-SCNC: 23 MMOL/L — SIGNIFICANT CHANGE UP (ref 22–31)
COLOR SPEC: YELLOW — SIGNIFICANT CHANGE UP
CREAT SERPL-MCNC: 0.85 MG/DL — SIGNIFICANT CHANGE UP (ref 0.5–1.3)
CREAT SERPL-MCNC: 0.87 MG/DL — SIGNIFICANT CHANGE UP (ref 0.5–1.3)
EOSINOPHIL # BLD AUTO: 0.13 K/UL — SIGNIFICANT CHANGE UP (ref 0–0.5)
EOSINOPHIL NFR BLD AUTO: 1.3 % — SIGNIFICANT CHANGE UP (ref 0–6)
GAS PNL BLDV: 137 MMOL/L — SIGNIFICANT CHANGE UP (ref 136–146)
GLUCOSE BLDV-MCNC: 113 — HIGH (ref 70–99)
GLUCOSE SERPL-MCNC: 105 MG/DL — HIGH (ref 70–99)
GLUCOSE SERPL-MCNC: 127 MG/DL — HIGH (ref 70–99)
GLUCOSE UR-MCNC: NEGATIVE — SIGNIFICANT CHANGE UP
GRAN CASTS # UR COMP ASSIST: SIGNIFICANT CHANGE UP
HCO3 BLDV-SCNC: 24 MMOL/L — SIGNIFICANT CHANGE UP (ref 20–27)
HCT VFR BLD CALC: 46.9 % — HIGH (ref 34.5–45)
HCT VFR BLDV CALC: 46.1 % — HIGH (ref 34.5–45)
HGB BLD-MCNC: 14.6 G/DL — SIGNIFICANT CHANGE UP (ref 11.5–15.5)
HGB BLDV-MCNC: 15 G/DL — SIGNIFICANT CHANGE UP (ref 11.5–15.5)
HYALINE CASTS # UR AUTO: SIGNIFICANT CHANGE UP
IMM GRANULOCYTES NFR BLD AUTO: 0.3 % — SIGNIFICANT CHANGE UP (ref 0–1.5)
KETONES UR-MCNC: NEGATIVE — SIGNIFICANT CHANGE UP
LACTATE BLDV-MCNC: 2 MMOL/L — SIGNIFICANT CHANGE UP (ref 0.5–2)
LEUKOCYTE ESTERASE UR-ACNC: NEGATIVE — SIGNIFICANT CHANGE UP
LYMPHOCYTES # BLD AUTO: 2.1 K/UL — SIGNIFICANT CHANGE UP (ref 1–3.3)
LYMPHOCYTES # BLD AUTO: 21.5 % — SIGNIFICANT CHANGE UP (ref 13–44)
MCHC RBC-ENTMCNC: 28.7 PG — SIGNIFICANT CHANGE UP (ref 27–34)
MCHC RBC-ENTMCNC: 31.1 % — LOW (ref 32–36)
MCV RBC AUTO: 92.1 FL — SIGNIFICANT CHANGE UP (ref 80–100)
MONOCYTES # BLD AUTO: 0.77 K/UL — SIGNIFICANT CHANGE UP (ref 0–0.9)
MONOCYTES NFR BLD AUTO: 7.9 % — SIGNIFICANT CHANGE UP (ref 2–14)
NEUTROPHILS # BLD AUTO: 6.69 K/UL — SIGNIFICANT CHANGE UP (ref 1.8–7.4)
NEUTROPHILS NFR BLD AUTO: 68.4 % — SIGNIFICANT CHANGE UP (ref 43–77)
NITRITE UR-MCNC: NEGATIVE — SIGNIFICANT CHANGE UP
NRBC # FLD: 0 K/UL — LOW (ref 25–125)
PCO2 BLDV: 56 MMHG — HIGH (ref 41–51)
PH BLDV: 7.32 PH — SIGNIFICANT CHANGE UP (ref 7.32–7.43)
PH UR: 6 — SIGNIFICANT CHANGE UP (ref 5–8)
PLATELET # BLD AUTO: 256 K/UL — SIGNIFICANT CHANGE UP (ref 150–400)
PMV BLD: 10.4 FL — SIGNIFICANT CHANGE UP (ref 7–13)
PO2 BLDV: 32 MMHG — LOW (ref 35–40)
POTASSIUM BLDV-SCNC: 4 MMOL/L — SIGNIFICANT CHANGE UP (ref 3.4–4.5)
POTASSIUM SERPL-MCNC: 4.2 MMOL/L — SIGNIFICANT CHANGE UP (ref 3.5–5.3)
POTASSIUM SERPL-MCNC: 4.5 MMOL/L — SIGNIFICANT CHANGE UP (ref 3.5–5.3)
POTASSIUM SERPL-SCNC: 4.2 MMOL/L — SIGNIFICANT CHANGE UP (ref 3.5–5.3)
POTASSIUM SERPL-SCNC: 4.5 MMOL/L — SIGNIFICANT CHANGE UP (ref 3.5–5.3)
PROT SERPL-MCNC: 7.3 G/DL — SIGNIFICANT CHANGE UP (ref 6–8.3)
PROT SERPL-MCNC: 7.3 G/DL — SIGNIFICANT CHANGE UP (ref 6–8.3)
PROT UR-MCNC: 30 — SIGNIFICANT CHANGE UP
RBC # BLD: 5.09 M/UL — SIGNIFICANT CHANGE UP (ref 3.8–5.2)
RBC # FLD: 13.2 % — SIGNIFICANT CHANGE UP (ref 10.3–14.5)
RBC CASTS # UR COMP ASSIST: SIGNIFICANT CHANGE UP (ref 0–?)
SAO2 % BLDV: 56.1 % — LOW (ref 60–85)
SODIUM SERPL-SCNC: 136 MMOL/L — SIGNIFICANT CHANGE UP (ref 135–145)
SODIUM SERPL-SCNC: 136 MMOL/L — SIGNIFICANT CHANGE UP (ref 135–145)
SP GR SPEC: 1.02 — SIGNIFICANT CHANGE UP (ref 1–1.04)
SQUAMOUS # UR AUTO: SIGNIFICANT CHANGE UP
TROPONIN T, HIGH SENSITIVITY: 29 NG/L — SIGNIFICANT CHANGE UP (ref ?–14)
TROPONIN T, HIGH SENSITIVITY: 29 NG/L — SIGNIFICANT CHANGE UP (ref ?–14)
TROPONIN T, HIGH SENSITIVITY: 36 NG/L — SIGNIFICANT CHANGE UP (ref ?–14)
UROBILINOGEN FLD QL: NORMAL — SIGNIFICANT CHANGE UP
WBC # BLD: 9.78 K/UL — SIGNIFICANT CHANGE UP (ref 3.8–10.5)
WBC # FLD AUTO: 9.78 K/UL — SIGNIFICANT CHANGE UP (ref 3.8–10.5)
WBC UR QL: HIGH (ref 0–?)

## 2019-01-06 PROCEDURE — 70450 CT HEAD/BRAIN W/O DYE: CPT | Mod: 26

## 2019-01-06 PROCEDURE — 71045 X-RAY EXAM CHEST 1 VIEW: CPT | Mod: 26

## 2019-01-06 RX ORDER — METOPROLOL TARTRATE 50 MG
50 TABLET ORAL DAILY
Qty: 0 | Refills: 0 | Status: DISCONTINUED | OUTPATIENT
Start: 2019-01-07 | End: 2019-01-08

## 2019-01-06 RX ORDER — HEPARIN SODIUM 5000 [USP'U]/ML
5000 INJECTION INTRAVENOUS; SUBCUTANEOUS EVERY 12 HOURS
Qty: 0 | Refills: 0 | Status: DISCONTINUED | OUTPATIENT
Start: 2019-01-06 | End: 2019-01-10

## 2019-01-06 RX ORDER — ASPIRIN/CALCIUM CARB/MAGNESIUM 324 MG
81 TABLET ORAL DAILY
Qty: 0 | Refills: 0 | Status: DISCONTINUED | OUTPATIENT
Start: 2019-01-06 | End: 2019-01-10

## 2019-01-06 RX ORDER — NYSTATIN CREAM 100000 [USP'U]/G
1 CREAM TOPICAL ONCE
Qty: 0 | Refills: 0 | Status: COMPLETED | OUTPATIENT
Start: 2019-01-06 | End: 2019-01-07

## 2019-01-06 RX ADMIN — Medication 0.1 MILLIGRAM(S): at 18:07

## 2019-01-06 NOTE — ED PROVIDER NOTE - MEDICAL DECISION MAKING DETAILS
89 yo woman PMH arthritis and HTN presents after episode of unresponsiveness and complaints of resolved left arm numbness. Concern for TIA vs cardiac etiology vs electrolyte abnormality vs pulmonary as pt was found to be hypoxic by EMS, will check CTH, CXR, cbc, cmp, vbg, ua, trop, and ecg. will continue to monitor and reassess.

## 2019-01-06 NOTE — H&P ADULT - BREASTS COMMENTS
Below right breast mild erythema and white spots noted. No active drainage Below right breast mild erythema and white spots noted. No active drainage. Chaperoned by JOSE A Tellez

## 2019-01-06 NOTE — ED PROVIDER NOTE - PROGRESS NOTE DETAILS
Laly Giang, resident MD: pt's blood pressure continues to be elevated. will prescribe clonidine. CT head is negative for hemorrhage. Will admit for syncope workup.

## 2019-01-06 NOTE — ED PROVIDER NOTE - PHYSICAL EXAMINATION
General: well-appearing, no acute distress  Head: normocephalic, atraumatic  Eyes: EOMI  Mouth: moist mucous membranes, dry lips, tongue and uvula midline  Neck: supple neck, no Cspine tenderness to palpation  CV: normal rate and rhythm, normal S1 and S2  Respiratory: clear to auscultation bilaterally anteriorly  Abdomen: soft, nontender, nondistended, bowel sounds present x 4 quadrants  Neuro: alert and oriented x3, CN III-XII grossly intact, diminished vision and hearing at baseline, speech clear, strength 5/5 UE and LE bilaterally, sensation equal and intact bilaterally  Skin: scaly rash on upper abdomen  Extremities: minimal edema of bilateral LE, peripheral pulses 2+ bilaterally

## 2019-01-06 NOTE — ED ADULT NURSE NOTE - OBJECTIVE STATEMENT
pt received to 22, aox3.  pt c/o episode of unresponsiveness and BUE numbness this am.  pt denies pain.  pt on tele MD thaddeus at bedside, awaiting further orders, will jamie

## 2019-01-06 NOTE — H&P ADULT - HISTORY OF PRESENT ILLNESS
89 y/o F with PMH of HTN, Arthritis presented with episode of unresponsiveness today while seated. As per daughter who is at bedside her mother normally walks with a walker and with assistance and today had finished eating her breakfast and then daughter was making her mother's bed and then when called out to her mother she was unresponsiveness. Daughter stated that her mother was seated on the wheelchair and she had LOC of about few minutes. Daughter then called EMS. When EMS arrived patient was AOx0 and unresponsive with shallow breathing and a weak radial pulse. Then O2 was administered and patient started to come back slowly to her baseline but was confused as to "why she is in the ambulance". As per daughter her mother had never had a syncopal episode. Daughter stated that her mother had a TTE done last year with her cardiologist and was told that "everything was normal". Daughter denied any head trauma, seizure like activity, bowel or bladder incontinence. Patient's only complaint is diffuse body pain 2/2 to her arthritis and intermittent APPIAH when she ambulates but denied any orthopnea or SOB at rest. Patient denied any fever, chills, CP, N/V/D/C, abdominal pain, dysuria, melena, hematochezia, recent travel, sick contact, pleuritic or positional chest pain.     On ED admission EKG revealed Sinus rhythm with 1st degree AV block and RBBB and QTc of 448, CE x 1: Trop: 36, CE x 2: Trop: 29, Gluc: 105, UA: Negative. CT head: Age-appropriate involutional and ischemic gliotic changes. No hemorrhage. Bilateral dentate nucleus calcifications. Prelim CXR:  No emergent finding. Follow up official report. Patient was given a dose of Clonidine 0.4mg for elevated blood pressure of 222/120 with repeat being 154/61. 89 y/o F with PMH of HTN, Arthritis presented with episode of unresponsiveness today while seated. As per daughter who is at bedside her mother normally walks with a walker and with assistance and today had finished eating her breakfast and then daughter was making her mother's bed and then when called out to her mother she was unresponsiveness. Daughter stated that her mother was seated on the wheelchair and she had LOC of about few minutes. Daughter then called EMS. When EMS arrived patient was AOx0 and unresponsive with shallow breathing and a weak radial pulse. Then O2 was administered and patient started to come back slowly to her baseline but was confused as to "why she is in the ambulance". As per daughter her mother had never had a syncopal episode. Daughter stated that her mother had a TTE done last year with her cardiologist and was told that "everything was normal". Daughter denied any head trauma, seizure like activity, bowel or bladder incontinence. Patient's only complaint is diffuse body pain 2/2 to her arthritis and intermittent APPIAH when she ambulates but denied any orthopnea or SOB at rest. Patient denied any fever, chills, CP, N/V/D/C, abdominal pain, dysuria, melena, hematochezia, recent travel, sick contact, pleuritic or positional chest pain.     On ED admission EKG revealed Sinus rhythm with 1st degree AV block and RBBB and QTc of 448, CE x 1: Trop: 36, CE x 2: Trop: 29, Gluc: 105, UA: Negative. CT head: Age-appropriate involutional and ischemic gliotic changes. No hemorrhage. Bilateral dentate nucleus calcifications. Prelim CXR:  No emergent finding. Follow up official report. Patient was given a dose of Clonidine 0.4mg for elevated blood pressure of 222/120 with repeat being 154/61.     No family history pertinent to patient's current medical condition

## 2019-01-06 NOTE — H&P ADULT - NEGATIVE NEUROLOGICAL SYMPTOMS
no hemiparesis/no weakness/no generalized seizures/no headache/no transient paralysis/no paresthesias/no tremors/no focal seizures/no vertigo/no loss of sensation

## 2019-01-06 NOTE — ED ADULT NURSE NOTE - NSIMPLEMENTINTERV_GEN_ALL_ED
Implemented All Fall with Harm Risk Interventions:  [place] to call system. Call bell, personal items and telephone within reach. Instruct patient to call for assistance. Room bathroom lighting operational. Non-slip footwear when patient is off stretcher. Physically safe environment: no spills, clutter or unnecessary equipment. Stretcher in lowest position, wheels locked, appropriate side rails in place. Provide visual cue, wrist band, yellow gown, etc. Monitor gait and stability. Monitor for mental status changes and reorient to person, place, and time. Review medications for side effects contributing to fall risk. Reinforce activity limits and safety measures with patient and family. Provide visual clues: red socks.

## 2019-01-06 NOTE — H&P ADULT - GASTROINTESTINAL DETAILS
no rebound tenderness/no rigidity/no guarding/bowel sounds normal/no distention/nontender/soft/normal

## 2019-01-06 NOTE — H&P ADULT - PROBLEM SELECTOR PLAN 1
Differential include neuro-cardiogenic due to age vs dehydration vs cardiac arrhythmia  Admit to telemetry, serial CE's, serial EKG  HgbA1C, TSH, lipid profile, CBC, CMP in am   TTE ordered to r/o any cardiac structural abnormalities   Orthostatics ordered   PT consult   Fall precautions ordered

## 2019-01-06 NOTE — ED ADULT NURSE REASSESSMENT NOTE - NS ED NURSE REASSESS COMMENT FT1
Pt. received in spot 21A report from Marcia YORK RN. Pt. A&O x3 with c/o high blood pressure and several episodes of unresponsiveness. vitals stable at this time; admitted to tele. Family at bedside. will continue to monitor. ST

## 2019-01-06 NOTE — ED PROVIDER NOTE - ATTENDING CONTRIBUTION TO CARE
[place]: 89 yof with arthritis, HTN requiring assistance device to get around. Today pt was being helped back to bed after breakfast when daughter noticed her to be unresponsive for few mnutes, no shaking noted. When EMS arrived pt was noted to be hypoxic, now in ED pt is at baseline. No history of similar. No cp, no sob, no fever, no headache. This am left arm numbness that has resolved. On exam, pt is well appearing, BP very very high (pt states when in hospital it is usually 230/130) EOMI, clear lungs, normal cardiac, abd soft and non tender, no motor deficits, sensation equal bilaterally. Pt is DNR. Although she is at baseline episode of unresponsiveness witnessed. Tele monitor, CT head, labs, trop repeat.

## 2019-01-06 NOTE — ED ADULT TRIAGE NOTE - CHIEF COMPLAINT QUOTE
pt comes to ED for SOB and unresponsive. pt was found by daughter not responding. Once EMS got the patient she was 92 % on RA [t arrives on non rebreather sating 99 %. pt is DNR not with her at this time. As per EMS pt is much more responsive at this time. pt A&0x2-3 at this time. EKG to be obtained. pmhx HTN and arthritis

## 2019-01-06 NOTE — H&P ADULT - PROBLEM SELECTOR PLAN 2
In the ED blood pressure was high as 222/120 and patient received Clonidine 0.4mg with repeat being 154/61.   Will continue with home antihypertensive medications with hold parameters   DASH diet recommended

## 2019-01-06 NOTE — H&P ADULT - NEGATIVE GASTROINTESTINAL SYMPTOMS
no vomiting/no melena/no constipation/no change in bowel habits/no nausea/no hematochezia/no abdominal pain/no diarrhea

## 2019-01-06 NOTE — ED ADULT NURSE REASSESSMENT NOTE - NS ED NURSE REASSESS COMMENT FT1
received pt in bed A and Ox  3 in NAD on monitor with VSS,  family at bedside. orders noted and completed.

## 2019-01-06 NOTE — ED PROVIDER NOTE - OBJECTIVE STATEMENT
89 yo woman PMH HTN and arthritis presents after an episode of unresponsiveness at around 11am. Pt had eaten breakfast and was going to be put back in bed by daughter when she became unresponsive. She describes a numbness of left arm and hand in the morning but does not have any change in sensation currently. Pt did not complain of any chest pain or SOB prior to unresponsiveness and no episode of shaking. Pt was found to have low SpO2. Pt denies any fevers or chills, no N/V/D, no abdominal pain, no chest pain, no SOB currently, no dysuria but has urge incontinence.

## 2019-01-06 NOTE — H&P ADULT - NSHPSOCIALHISTORY_GEN_ALL_CORE
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

## 2019-01-06 NOTE — H&P ADULT - NEGATIVE ENMT SYMPTOMS
no nasal discharge/no tinnitus/no sinus symptoms/no nasal congestion/no vertigo/no hearing difficulty/no ear pain

## 2019-01-06 NOTE — H&P ADULT - RS GEN PE MLT RESP DETAILS PC
clear to auscultation bilaterally/good air movement/no chest wall tenderness/no intercostal retractions/airway patent/no rales/breath sounds equal/no rhonchi/no wheezes/respirations non-labored/normal

## 2019-01-06 NOTE — ED PROVIDER NOTE - NS ED ROS FT
General: no fevers or chills  Head: no headaches  Eyes: chronic decreased vision  ENT: chronic decreased hearing  CV: no chest pain  Resp: +SOB, no cough  GI: no N/V/D, no blood in stool  : no dysuria  MSK: +joint pain 2/2 arthritis  Skin: +rash under breast  Neuro: +numbness of left hand

## 2019-01-06 NOTE — H&P ADULT - NSHPLABSRESULTS_GEN_ALL_CORE
14.6   9.78  )-----------( 256      ( 06 Jan 2019 12:54 )             46.9     01-06    136  |  99  |  26<H>  ----------------------------<  105<H>  4.5   |  20<L>  |  0.85    Ca    9.3      06 Jan 2019 14:18    TPro  7.3  /  Alb  3.8  /  TBili  0.3  /  DBili  x   /  AST  18  /  ALT  9   /  AlkPhos  78  01-06    CT head: Age-appropriate involutional and ischemic gliotic changes. No hemorrhage. Bilateral dentate nucleus calcifications.    Prelim CXR:  No emergent finding. Follow up official report    EKG: Sinus rhythm with 1st degree AV block and RBBB and QTc of 448 14.6   9.78  )-----------( 256      ( 06 Jan 2019 12:54 )             46.9     01-06    136  |  99  |  26<H>  ----------------------------<  105<H>  4.5   |  20<L>  |  0.85    Ca    9.3      06 Jan 2019 14:18    TPro  7.3  /  Alb  3.8  /  TBili  0.3  /  DBili  x   /  AST  18  /  ALT  9   /  AlkPhos  78  01-06    CT head: Age-appropriate involutional and ischemic gliotic changes. No hemorrhage. Bilateral dentate nucleus calcifications.    Prelim CXR:  No emergent finding. Follow up official report    EKG reviewed personally: Sinus rhythm with 1st degree AV block and RBBB and QTc of 448

## 2019-01-07 LAB
CHOLEST SERPL-MCNC: 293 MG/DL — HIGH (ref 120–199)
HBA1C BLD-MCNC: 5.8 % — HIGH (ref 4–5.6)
HCT VFR BLD CALC: 44.9 % — SIGNIFICANT CHANGE UP (ref 34.5–45)
HDLC SERPL-MCNC: 61 MG/DL — SIGNIFICANT CHANGE UP (ref 45–65)
HGB BLD-MCNC: 14.5 G/DL — SIGNIFICANT CHANGE UP (ref 11.5–15.5)
LIPID PNL WITH DIRECT LDL SERPL: 228 MG/DL — SIGNIFICANT CHANGE UP
MCHC RBC-ENTMCNC: 28.9 PG — SIGNIFICANT CHANGE UP (ref 27–34)
MCHC RBC-ENTMCNC: 32.3 % — SIGNIFICANT CHANGE UP (ref 32–36)
MCV RBC AUTO: 89.4 FL — SIGNIFICANT CHANGE UP (ref 80–100)
NRBC # FLD: 0 K/UL — LOW (ref 25–125)
PLATELET # BLD AUTO: 249 K/UL — SIGNIFICANT CHANGE UP (ref 150–400)
PMV BLD: 10.4 FL — SIGNIFICANT CHANGE UP (ref 7–13)
RBC # BLD: 5.02 M/UL — SIGNIFICANT CHANGE UP (ref 3.8–5.2)
RBC # FLD: 13.5 % — SIGNIFICANT CHANGE UP (ref 10.3–14.5)
TRIGL SERPL-MCNC: 104 MG/DL — SIGNIFICANT CHANGE UP (ref 10–149)
TSH SERPL-MCNC: 2.16 UIU/ML — SIGNIFICANT CHANGE UP (ref 0.27–4.2)
WBC # BLD: 9.31 K/UL — SIGNIFICANT CHANGE UP (ref 3.8–10.5)
WBC # FLD AUTO: 9.31 K/UL — SIGNIFICANT CHANGE UP (ref 3.8–10.5)

## 2019-01-07 PROCEDURE — 99223 1ST HOSP IP/OBS HIGH 75: CPT

## 2019-01-07 RX ORDER — HYDRALAZINE HCL 50 MG
5 TABLET ORAL ONCE
Qty: 0 | Refills: 0 | Status: COMPLETED | OUTPATIENT
Start: 2019-01-07 | End: 2019-01-07

## 2019-01-07 RX ADMIN — Medication 81 MILLIGRAM(S): at 13:11

## 2019-01-07 RX ADMIN — NYSTATIN CREAM 1 APPLICATION(S): 100000 CREAM TOPICAL at 06:46

## 2019-01-07 RX ADMIN — HEPARIN SODIUM 5000 UNIT(S): 5000 INJECTION INTRAVENOUS; SUBCUTANEOUS at 17:54

## 2019-01-07 RX ADMIN — Medication 5 MILLIGRAM(S): at 01:41

## 2019-01-07 RX ADMIN — HEPARIN SODIUM 5000 UNIT(S): 5000 INJECTION INTRAVENOUS; SUBCUTANEOUS at 06:53

## 2019-01-07 RX ADMIN — Medication 50 MILLIGRAM(S): at 06:53

## 2019-01-07 NOTE — PROGRESS NOTE ADULT - ASSESSMENT
89 y/o F with PMH of HTN, Arthritis presented with episode of unresponsiveness, mental status now improved with BP control.

## 2019-01-07 NOTE — PROGRESS NOTE ADULT - PROBLEM SELECTOR PLAN 2
- In the ED blood pressure was high as 222/120 and patient received Clonidine 0.4mg with repeat being 154/61.   - Will continue with home antihypertensive medications with hold parameters   - DASH diet

## 2019-01-07 NOTE — PROGRESS NOTE ADULT - SUBJECTIVE AND OBJECTIVE BOX
Patient is a 89y old  Female who presents with a chief complaint of Episode of unresponsiveness (2019 22:05)        SUBJECTIVE / OVERNIGHT EVENTS: No major overnight events, BP improved, pt resting comfortably on stretcher.  Pt slightly confused, believes there's papers on her stretchers but there aren't and she's unclear as to why she's in the hospital.       MEDICATIONS  (STANDING):  aspirin enteric coated 81 milliGRAM(s) Oral daily  heparin  Injectable 5000 Unit(s) SubCutaneous every 12 hours  metoprolol succinate ER 50 milliGRAM(s) Oral daily    Vital Signs Last 24 Hrs  T(C): 36.8 (2019 05:07), Max: 36.8 (2019 05:07)  T(F): 98.2 (2019 05:07), Max: 98.2 (2019 05:07)  HR: 94 (2019 05:07) (60 - 101)  BP: 128/80 (2019 05:07) (128/80 - 208/112)  BP(mean): --  RR: 18 (2019 05:07) (16 - 18)  SpO2: 98% (2019 05:07) (97% - 98%)  CAPILLARY BLOOD GLUCOSE      PHYSICAL EXAM  GENERAL: NAD, well-developed  HEAD:  Atraumatic, Normocephalic  EYES: EOMI, PERRLA, conjunctiva and sclera clear  NECK: Supple, No JVD  CHEST/LUNG: Clear to auscultation bilaterally; No wheeze  HEART: Regular rate and rhythm; No murmurs, rubs, or gallops  ABDOMEN: Soft, Nontender, Nondistended; Bowel sounds present  EXTREMITIES:  2+ Peripheral Pulses, No clubbing, cyanosis, chronic venous stasis changes in b/l LE   PSYCH: AAOx2      LABS:                        14.5   9.31  )-----------( 249      ( 2019 06:25 )             44.9     01-06    136  |  99  |  26<H>  ----------------------------<  105<H>  4.5   |  20<L>  |  0.85    Ca    9.3      2019 14:18    TPro  7.3  /  Alb  3.8  /  TBili  0.3  /  DBili  x   /  AST  18  /  ALT  9   /  AlkPhos  78  01-      CARDIAC MARKERS ( 2019 22:27 )  x     / x     / 48 u/L / 2.89 ng/mL / x          Urinalysis Basic - ( 2019 12:54 )    Color: YELLOW / Appearance: Lt TURBID / S.021 / pH: 6.0  Gluc: NEGATIVE / Ketone: NEGATIVE  / Bili: NEGATIVE / Urobili: NORMAL   Blood: NEGATIVE / Protein: 30 / Nitrite: NEGATIVE   Leuk Esterase: NEGATIVE / RBC: 3-5 / WBC 11-25   Sq Epi: MODERATE / Non Sq Epi: x / Bacteria: FEW        RADIOLOGY & ADDITIONAL TESTS:    Imaging Personally Reviewed:  < from: Xray Chest 1 View- PORTABLE-Urgent (19 @ 13:58) >  IMPRESSION:    Clear lungs.     < end of copied text >  < from: CT Head No Cont (19 @ 16:48) >  IMPRESSION: Age-appropriate involutional and ischemic gliotic changes. No   hemorrhage. Bilateral dentate nucleus calcifications.      < end of copied text >

## 2019-01-07 NOTE — ED ADULT NURSE REASSESSMENT NOTE - NS ED NURSE REASSESS COMMENT FT1
got rpt from KATHERYN Holley. pt is admitted to tele. pt is a&0x3 at this time. pt is refusing VS at this time pt is awaiting for her daughter to come awaiting further orders Will continue to monitor the pt

## 2019-01-07 NOTE — PROGRESS NOTE ADULT - PROBLEM SELECTOR PLAN 3
On heparin sq 5000U q12hrs  DNR/DNI with MOLST form in chart  Dispo: pending PT evaluation, will update pt's family

## 2019-01-07 NOTE — PROGRESS NOTE ADULT - PROBLEM SELECTOR PLAN 1
- Differential include neuro-cardiogenic due to age vs dehydration vs cardiac arrhythmia vs HTN encephalopathy  - Admit to telemetry, serial CE's, serial EKG, no evidence of acute ischemia   - TTE ordered to r/o any cardiac structural abnormalities    -Fall precautions, PT evaluation pending

## 2019-01-08 DIAGNOSIS — M19.90 UNSPECIFIED OSTEOARTHRITIS, UNSPECIFIED SITE: ICD-10-CM

## 2019-01-08 DIAGNOSIS — R55 SYNCOPE AND COLLAPSE: ICD-10-CM

## 2019-01-08 DIAGNOSIS — R50.9 FEVER, UNSPECIFIED: ICD-10-CM

## 2019-01-08 LAB
B PERT DNA SPEC QL NAA+PROBE: NOT DETECTED — SIGNIFICANT CHANGE UP
BASOPHILS # BLD AUTO: 0.06 K/UL — SIGNIFICANT CHANGE UP (ref 0–0.2)
BASOPHILS NFR BLD AUTO: 0.5 % — SIGNIFICANT CHANGE UP (ref 0–2)
BUN SERPL-MCNC: 24 MG/DL — HIGH (ref 7–23)
C PNEUM DNA SPEC QL NAA+PROBE: NOT DETECTED — SIGNIFICANT CHANGE UP
CALCIUM SERPL-MCNC: 9.8 MG/DL — SIGNIFICANT CHANGE UP (ref 8.4–10.5)
CHLORIDE SERPL-SCNC: 101 MMOL/L — SIGNIFICANT CHANGE UP (ref 98–107)
CO2 SERPL-SCNC: 24 MMOL/L — SIGNIFICANT CHANGE UP (ref 22–31)
CREAT SERPL-MCNC: 0.78 MG/DL — SIGNIFICANT CHANGE UP (ref 0.5–1.3)
EOSINOPHIL # BLD AUTO: 0.04 K/UL — SIGNIFICANT CHANGE UP (ref 0–0.5)
EOSINOPHIL NFR BLD AUTO: 0.3 % — SIGNIFICANT CHANGE UP (ref 0–6)
FLUAV H1 2009 PAND RNA SPEC QL NAA+PROBE: NOT DETECTED — SIGNIFICANT CHANGE UP
FLUAV H1 RNA SPEC QL NAA+PROBE: NOT DETECTED — SIGNIFICANT CHANGE UP
FLUAV H3 RNA SPEC QL NAA+PROBE: NOT DETECTED — SIGNIFICANT CHANGE UP
FLUAV SUBTYP SPEC NAA+PROBE: NOT DETECTED — SIGNIFICANT CHANGE UP
FLUBV RNA SPEC QL NAA+PROBE: NOT DETECTED — SIGNIFICANT CHANGE UP
GLUCOSE SERPL-MCNC: 110 MG/DL — HIGH (ref 70–99)
HADV DNA SPEC QL NAA+PROBE: NOT DETECTED — SIGNIFICANT CHANGE UP
HCOV PNL SPEC NAA+PROBE: SIGNIFICANT CHANGE UP
HCT VFR BLD CALC: 44.4 % — SIGNIFICANT CHANGE UP (ref 34.5–45)
HGB BLD-MCNC: 14.2 G/DL — SIGNIFICANT CHANGE UP (ref 11.5–15.5)
HMPV RNA SPEC QL NAA+PROBE: NOT DETECTED — SIGNIFICANT CHANGE UP
HPIV1 RNA SPEC QL NAA+PROBE: NOT DETECTED — SIGNIFICANT CHANGE UP
HPIV2 RNA SPEC QL NAA+PROBE: NOT DETECTED — SIGNIFICANT CHANGE UP
HPIV3 RNA SPEC QL NAA+PROBE: NOT DETECTED — SIGNIFICANT CHANGE UP
HPIV4 RNA SPEC QL NAA+PROBE: NOT DETECTED — SIGNIFICANT CHANGE UP
IMM GRANULOCYTES NFR BLD AUTO: 0.3 % — SIGNIFICANT CHANGE UP (ref 0–1.5)
LYMPHOCYTES # BLD AUTO: 18.6 % — SIGNIFICANT CHANGE UP (ref 13–44)
LYMPHOCYTES # BLD AUTO: 2.14 K/UL — SIGNIFICANT CHANGE UP (ref 1–3.3)
MAGNESIUM SERPL-MCNC: 2.2 MG/DL — SIGNIFICANT CHANGE UP (ref 1.6–2.6)
MCHC RBC-ENTMCNC: 29.4 PG — SIGNIFICANT CHANGE UP (ref 27–34)
MCHC RBC-ENTMCNC: 32 % — SIGNIFICANT CHANGE UP (ref 32–36)
MCV RBC AUTO: 91.9 FL — SIGNIFICANT CHANGE UP (ref 80–100)
MONOCYTES # BLD AUTO: 1.11 K/UL — HIGH (ref 0–0.9)
MONOCYTES NFR BLD AUTO: 9.7 % — SIGNIFICANT CHANGE UP (ref 2–14)
NEUTROPHILS # BLD AUTO: 8.09 K/UL — HIGH (ref 1.8–7.4)
NEUTROPHILS NFR BLD AUTO: 70.6 % — SIGNIFICANT CHANGE UP (ref 43–77)
NRBC # FLD: 0 K/UL — LOW (ref 25–125)
PLATELET # BLD AUTO: 224 K/UL — SIGNIFICANT CHANGE UP (ref 150–400)
PMV BLD: 10 FL — SIGNIFICANT CHANGE UP (ref 7–13)
POTASSIUM SERPL-MCNC: 3.9 MMOL/L — SIGNIFICANT CHANGE UP (ref 3.5–5.3)
POTASSIUM SERPL-SCNC: 3.9 MMOL/L — SIGNIFICANT CHANGE UP (ref 3.5–5.3)
RBC # BLD: 4.83 M/UL — SIGNIFICANT CHANGE UP (ref 3.8–5.2)
RBC # FLD: 13.5 % — SIGNIFICANT CHANGE UP (ref 10.3–14.5)
RSV RNA SPEC QL NAA+PROBE: NOT DETECTED — SIGNIFICANT CHANGE UP
RV+EV RNA SPEC QL NAA+PROBE: NOT DETECTED — SIGNIFICANT CHANGE UP
SODIUM SERPL-SCNC: 136 MMOL/L — SIGNIFICANT CHANGE UP (ref 135–145)
WBC # BLD: 11.48 K/UL — HIGH (ref 3.8–10.5)
WBC # FLD AUTO: 11.48 K/UL — HIGH (ref 3.8–10.5)

## 2019-01-08 PROCEDURE — 99233 SBSQ HOSP IP/OBS HIGH 50: CPT

## 2019-01-08 RX ORDER — METOPROLOL TARTRATE 50 MG
25 TABLET ORAL
Qty: 0 | Refills: 0 | Status: DISCONTINUED | OUTPATIENT
Start: 2019-01-08 | End: 2019-01-10

## 2019-01-08 RX ADMIN — Medication 25 MILLIGRAM(S): at 17:19

## 2019-01-08 RX ADMIN — HEPARIN SODIUM 5000 UNIT(S): 5000 INJECTION INTRAVENOUS; SUBCUTANEOUS at 17:19

## 2019-01-08 RX ADMIN — Medication 81 MILLIGRAM(S): at 11:25

## 2019-01-08 RX ADMIN — Medication 50 MILLIGRAM(S): at 11:25

## 2019-01-08 RX ADMIN — HEPARIN SODIUM 5000 UNIT(S): 5000 INJECTION INTRAVENOUS; SUBCUTANEOUS at 06:17

## 2019-01-08 NOTE — PROGRESS NOTE ADULT - PROBLEM SELECTOR PLAN 1
R/O Cardiac and infectious cause.  Tele monitor. TTE pending.  Monitor BP.  Continue infection work up.

## 2019-01-08 NOTE — PROGRESS NOTE ADULT - SUBJECTIVE AND OBJECTIVE BOX
CC: F/U for syncope    SUBJECTIVE / OVERNIGHT EVENTS:  Patient with daughter by bedside.  Patient is more obtunded than usual at home with mild disorientation.   Nurse just took a rectal temp and it was 101.  Has been complaining of arthritic pain that's not relieved with tynelol.  No F/C, N/V, CP, SOB, Cough, lightheadedness, dizziness, abdominal pain, diarrhea, dysuria.    MEDICATIONS  (STANDING):  aspirin enteric coated 81 milliGRAM(s) Oral daily  heparin  Injectable 5000 Unit(s) SubCutaneous every 12 hours  metoprolol succinate ER 50 milliGRAM(s) Oral daily    MEDICATIONS  (PRN):      Vital Signs Last 24 Hrs  T(C): 36.9 (2019 12:07), Max: 38.3 (2019 11:03)  T(F): 98.4 (2019 12:07), Max: 101 (2019 11:03)  HR: 81 (2019 12:07) (77 - 95)  BP: 150/94 (2019 12:07) (147/59 - 194/111)  BP(mean): --  RR: 18 (2019 12:07) (18 - 20)  SpO2: 98% (2019 12:07) (94% - 98%)  CAPILLARY BLOOD GLUCOSE        I&O's Summary      PHYSICAL EXAM:  GENERAL: NAD, obese  HEAD:  Atraumatic, Normocephalic  EYES: EOMI, PERRLA, conjunctiva and sclera clear  NECK: Supple, No JVD  CHEST/LUNG: Clear to auscultation bilaterally; No wheeze  HEART: Regular rate and rhythm; No murmurs, rubs, or gallops  ABDOMEN: Soft, Nontender, Nondistended; Bowel sounds present  EXTREMITIES:  2+ Peripheral Pulses, No clubbing, cyanosis, or edema  PSYCH: Calm  NEUROLOGY: A/Ox1 - obtunded  SKIN: No rashes or lesions    LABS:                        14.2   11.48 )-----------( 224      ( 2019 05:55 )             44.4         136  |  101  |  24<H>  ----------------------------<  110<H>  3.9   |  24  |  0.78    Ca    9.8      2019 05:55  Mg     2.2         TPro  7.3  /  Alb  3.8  /  TBili  0.3  /  DBili  x   /  AST  18  /  ALT  9   /  AlkPhos  78        CARDIAC MARKERS ( 2019 22:27 )  x     / x     / 48 u/L / 2.89 ng/mL / x          Urinalysis Basic - ( 2019 12:54 )    Color: YELLOW / Appearance: Lt TURBID / S.021 / pH: 6.0  Gluc: NEGATIVE / Ketone: NEGATIVE  / Bili: NEGATIVE / Urobili: NORMAL   Blood: NEGATIVE / Protein: 30 / Nitrite: NEGATIVE   Leuk Esterase: NEGATIVE / RBC: 3-5 / WBC 11-25   Sq Epi: MODERATE / Non Sq Epi: x / Bacteria: FEW    < from: Xray Chest 1 View- PORTABLE-Urgent (19 @ 13:58) >  FINDINGS:    Lines/Tubes: None.    Lungs: The lungs are clear.    Pleura: No pleural effusion. No pneumothorax.    Mediastinum: Cardiac silhouette cannot be assessed.    Skeletal: No acute osseous findings.      IMPRESSION:    Clear lungs.               MARIANO NJ M.D.,RADIOLOGY RESIDENT  This document has been electronically signed.  HARSHA STRICKLAND M.D., ATTENDING RADIOLOGIST  This document has been electronically signed. 2019 10:31AM    < end of copied text >    < from: CT Head No Cont (19 @ 16:48) >  The ventricles and sulci are prominent consistent age appropriate   involutional changes. Small vessel white matter ischemic changes are   noted. There is no hemorrhage, mass, or shift of midline structures.   Calcifications in the dentate nuclei of the cerebellum are identified   bilaterally. Bone window examination is unremarkable. There has been   previous bilateral lens replacement surgery.    IMPRESSION: Age-appropriate involutional and ischemic gliotic changes. No   hemorrhage. Bilateral dentate nucleus calcifications.                  JOSHUA MAURO M.D., ATTENDING RADIOLOGIST  This document has been electronically signed. 2019  5:06PM    < end of copied text >      RADIOLOGY & ADDITIONAL TESTS:    Imaging Personally Reviewed:    Care Discussed with Consultants/Other Providers:

## 2019-01-08 NOTE — PROGRESS NOTE ADULT - PROBLEM SELECTOR PLAN 3
Aim for 20 % reduction in first 24 hours.  Change toprol XL to lopressor tomorrow for more optimal titration.

## 2019-01-09 DIAGNOSIS — Z29.9 ENCOUNTER FOR PROPHYLACTIC MEASURES, UNSPECIFIED: ICD-10-CM

## 2019-01-09 LAB
BASOPHILS # BLD AUTO: 0.03 K/UL — SIGNIFICANT CHANGE UP (ref 0–0.2)
BASOPHILS NFR BLD AUTO: 0.2 % — SIGNIFICANT CHANGE UP (ref 0–2)
BUN SERPL-MCNC: 28 MG/DL — HIGH (ref 7–23)
CALCIUM SERPL-MCNC: 9.3 MG/DL — SIGNIFICANT CHANGE UP (ref 8.4–10.5)
CHLORIDE SERPL-SCNC: 103 MMOL/L — SIGNIFICANT CHANGE UP (ref 98–107)
CO2 SERPL-SCNC: 23 MMOL/L — SIGNIFICANT CHANGE UP (ref 22–31)
CREAT SERPL-MCNC: 0.8 MG/DL — SIGNIFICANT CHANGE UP (ref 0.5–1.3)
EOSINOPHIL # BLD AUTO: 0 K/UL — SIGNIFICANT CHANGE UP (ref 0–0.5)
EOSINOPHIL NFR BLD AUTO: 0 % — SIGNIFICANT CHANGE UP (ref 0–6)
GLUCOSE SERPL-MCNC: 118 MG/DL — HIGH (ref 70–99)
HCT VFR BLD CALC: 41.5 % — SIGNIFICANT CHANGE UP (ref 34.5–45)
HGB BLD-MCNC: 13.3 G/DL — SIGNIFICANT CHANGE UP (ref 11.5–15.5)
IMM GRANULOCYTES NFR BLD AUTO: 0.4 % — SIGNIFICANT CHANGE UP (ref 0–1.5)
LYMPHOCYTES # BLD AUTO: 17.1 % — SIGNIFICANT CHANGE UP (ref 13–44)
LYMPHOCYTES # BLD AUTO: 2.28 K/UL — SIGNIFICANT CHANGE UP (ref 1–3.3)
MAGNESIUM SERPL-MCNC: 2.1 MG/DL — SIGNIFICANT CHANGE UP (ref 1.6–2.6)
MCHC RBC-ENTMCNC: 28.7 PG — SIGNIFICANT CHANGE UP (ref 27–34)
MCHC RBC-ENTMCNC: 32 % — SIGNIFICANT CHANGE UP (ref 32–36)
MCV RBC AUTO: 89.6 FL — SIGNIFICANT CHANGE UP (ref 80–100)
MONOCYTES # BLD AUTO: 1.47 K/UL — HIGH (ref 0–0.9)
MONOCYTES NFR BLD AUTO: 11 % — SIGNIFICANT CHANGE UP (ref 2–14)
NEUTROPHILS # BLD AUTO: 9.53 K/UL — HIGH (ref 1.8–7.4)
NEUTROPHILS NFR BLD AUTO: 71.3 % — SIGNIFICANT CHANGE UP (ref 43–77)
NRBC # FLD: 0 K/UL — LOW (ref 25–125)
PLATELET # BLD AUTO: 241 K/UL — SIGNIFICANT CHANGE UP (ref 150–400)
PMV BLD: 11 FL — SIGNIFICANT CHANGE UP (ref 7–13)
POTASSIUM SERPL-MCNC: 3.7 MMOL/L — SIGNIFICANT CHANGE UP (ref 3.5–5.3)
POTASSIUM SERPL-SCNC: 3.7 MMOL/L — SIGNIFICANT CHANGE UP (ref 3.5–5.3)
RBC # BLD: 4.63 M/UL — SIGNIFICANT CHANGE UP (ref 3.8–5.2)
RBC # FLD: 13.8 % — SIGNIFICANT CHANGE UP (ref 10.3–14.5)
SODIUM SERPL-SCNC: 139 MMOL/L — SIGNIFICANT CHANGE UP (ref 135–145)
SPECIMEN SOURCE: SIGNIFICANT CHANGE UP
WBC # BLD: 13.37 K/UL — HIGH (ref 3.8–10.5)
WBC # FLD AUTO: 13.37 K/UL — HIGH (ref 3.8–10.5)

## 2019-01-09 PROCEDURE — 99233 SBSQ HOSP IP/OBS HIGH 50: CPT

## 2019-01-09 RX ORDER — NYSTATIN CREAM 100000 [USP'U]/G
1 CREAM TOPICAL
Qty: 0 | Refills: 0 | Status: DISCONTINUED | OUTPATIENT
Start: 2019-01-09 | End: 2019-01-10

## 2019-01-09 RX ADMIN — HEPARIN SODIUM 5000 UNIT(S): 5000 INJECTION INTRAVENOUS; SUBCUTANEOUS at 17:27

## 2019-01-09 RX ADMIN — NYSTATIN CREAM 1 APPLICATION(S): 100000 CREAM TOPICAL at 17:27

## 2019-01-09 RX ADMIN — Medication 25 MILLIGRAM(S): at 17:27

## 2019-01-09 RX ADMIN — Medication 81 MILLIGRAM(S): at 11:34

## 2019-01-09 RX ADMIN — HEPARIN SODIUM 5000 UNIT(S): 5000 INJECTION INTRAVENOUS; SUBCUTANEOUS at 05:24

## 2019-01-09 RX ADMIN — Medication 25 MILLIGRAM(S): at 05:24

## 2019-01-09 NOTE — PROGRESS NOTE ADULT - ASSESSMENT
89F HTN, OA, presents with acute syncope - concern for underlying infectious cause given new fever 1/8.

## 2019-01-09 NOTE — PROGRESS NOTE ADULT - PROBLEM SELECTOR PLAN 3
- Urgency now resolved  - BP better controlled compared to admission, continue with current Lopressor 25mg BID

## 2019-01-09 NOTE — PROGRESS NOTE ADULT - SUBJECTIVE AND OBJECTIVE BOX
Patient is a 89y old  Female who presents with a chief complaint of Episode of unresponsiveness (08 Jan 2019 12:27)      SUBJECTIVE / OVERNIGHT EVENTS: Pt feels much better, states that she's never had issues with BP before and does not want to take any more pills than she already does.  Has regular followup with PMD and cardiologist.  Reports chronic pain in her knees from severe OA, lives with daughter who provides much assistance.       MEDICATIONS  (STANDING):  aspirin enteric coated 81 milliGRAM(s) Oral daily  heparin  Injectable 5000 Unit(s) SubCutaneous every 12 hours  metoprolol tartrate 25 milliGRAM(s) Oral two times a day        Vital Signs Last 24 Hrs  T(C): 37.2 (09 Jan 2019 09:25), Max: 37.2 (09 Jan 2019 01:00)  T(F): 98.9 (09 Jan 2019 09:25), Max: 98.9 (09 Jan 2019 01:00)  HR: 85 (09 Jan 2019 11:26) (74 - 92)  BP: 131/68 (09 Jan 2019 09:25) (131/68 - 152/90)  BP(mean): --  RR: 18 (09 Jan 2019 09:25) (18 - 18)  SpO2: 98% (09 Jan 2019 09:25) (96% - 98%)  CAPILLARY BLOOD GLUCOSE      PHYSICAL EXAM  GENERAL: NAD, well-developed  HEAD:  Atraumatic, Normocephalic  EYES: EOMI, PERRLA, conjunctiva and sclera clear  NECK: Supple, No JVD  CHEST/LUNG: Clear to auscultation bilaterally; No wheeze  HEART: Regular rate and rhythm; No murmurs, rubs, or gallops  ABDOMEN: Soft, Nontender, Nondistended; Bowel sounds present  EXTREMITIES:  2+ Peripheral Pulses, No clubbing, cyanosis, chronic venous stasis changes in b/l LE  PSYCH: AAOx3  SKIN: +fungal dermatitis under breasts     LABS:                        13.3   13.37 )-----------( 241      ( 09 Jan 2019 07:15 )             41.5     01-09    139  |  103  |  28<H>  ----------------------------<  118<H>  3.7   |  23  |  0.80    Ca    9.3      09 Jan 2019 07:15  Mg     2.1     01-09        RADIOLOGY & ADDITIONAL TESTS:    Imaging Personally Reviewed:  < from: Xray Chest 1 View- PORTABLE-Urgent (01.06.19 @ 13:58) >    IMPRESSION:    Clear lungs.         < end of copied text >    < from: CT Head No Cont (01.06.19 @ 16:48) >  IMPRESSION: Age-appropriate involutional and ischemic gliotic changes. No   hemorrhage. Bilateral dentate nucleus calcifications.          < end of copied text >

## 2019-01-09 NOTE — PROGRESS NOTE ADULT - PROBLEM SELECTOR PLAN 1
- Possibly related to dehydration vs infection, low suspicion of arrythmia/ischemia  - TTE may be done as outpatient, BP better controlled, but still suboptimal.  Pt adamantly opposed to starting new medications, will discuss with PMD (Dr. Dalal out of office today)

## 2019-01-09 NOTE — PROGRESS NOTE ADULT - PROBLEM SELECTOR PLAN 5
- IMPROVE 2  - HSQ for DVT ppx  - Dispo: pt and daughter refusing [facility].  CM aware and awaiting arrangement of home care

## 2019-01-10 ENCOUNTER — TRANSCRIPTION ENCOUNTER (OUTPATIENT)
Age: 84
End: 2019-01-10

## 2019-01-10 VITALS
SYSTOLIC BLOOD PRESSURE: 150 MMHG | HEART RATE: 68 BPM | TEMPERATURE: 98 F | RESPIRATION RATE: 18 BRPM | DIASTOLIC BLOOD PRESSURE: 92 MMHG | OXYGEN SATURATION: 96 %

## 2019-01-10 PROCEDURE — 93306 TTE W/DOPPLER COMPLETE: CPT | Mod: 26

## 2019-01-10 PROCEDURE — 99239 HOSP IP/OBS DSCHRG MGMT >30: CPT

## 2019-01-10 PROCEDURE — 93010 ELECTROCARDIOGRAM REPORT: CPT

## 2019-01-10 RX ORDER — ASPIRIN/CALCIUM CARB/MAGNESIUM 324 MG
1 TABLET ORAL
Qty: 0 | Refills: 0 | COMMUNITY

## 2019-01-10 RX ORDER — METOPROLOL TARTRATE 50 MG
1 TABLET ORAL
Qty: 60 | Refills: 0 | OUTPATIENT
Start: 2019-01-10 | End: 2019-02-08

## 2019-01-10 RX ORDER — NYSTATIN CREAM 100000 [USP'U]/G
1 CREAM TOPICAL
Qty: 1 | Refills: 0 | OUTPATIENT
Start: 2019-01-10 | End: 2019-01-24

## 2019-01-10 RX ORDER — METOPROLOL TARTRATE 50 MG
1 TABLET ORAL
Qty: 0 | Refills: 0 | COMMUNITY

## 2019-01-10 RX ADMIN — Medication 81 MILLIGRAM(S): at 12:41

## 2019-01-10 RX ADMIN — HEPARIN SODIUM 5000 UNIT(S): 5000 INJECTION INTRAVENOUS; SUBCUTANEOUS at 05:24

## 2019-01-10 RX ADMIN — NYSTATIN CREAM 1 APPLICATION(S): 100000 CREAM TOPICAL at 05:22

## 2019-01-10 RX ADMIN — Medication 25 MILLIGRAM(S): at 05:22

## 2019-01-10 NOTE — PROGRESS NOTE ADULT - PROBLEM SELECTOR PLAN 5
- IMPROVE 2  - HSQ for DVT ppx  - Dispo: pt and daughter refusing [facility].  CM setup home care  D/C time: 32 minutes including discussion of outpt f/u

## 2019-01-10 NOTE — PROGRESS NOTE ADULT - REASON FOR ADMISSION
Episode of unresponsiveness

## 2019-01-10 NOTE — PROGRESS NOTE ADULT - PROBLEM SELECTOR PLAN 1
- Possibly related to dehydration vs infection, low suspicion of arrythmia/ischemia  - TTE reviewed, mild AS, no LV dysfunction.  BP better controlled, but still suboptimal.  Pt adamantly opposed to starting new medications, emailed PMD for followup.

## 2019-01-10 NOTE — DISCHARGE NOTE ADULT - MEDICATION SUMMARY - MEDICATIONS TO TAKE
I will START or STAY ON the medications listed below when I get home from the hospital:    aspirin 81 mg oral tablet  -- 1 tab(s) by mouth once a day  -- Indication: For Heart    Toprol-XL 50 mg oral tablet, extended release  -- 1 tab(s) by mouth once a day  -- Indication: For HTN    nystatin 100,000 units/g topical powder  -- 1 application on skin 2 times a day  -- Indication: For Candida    Zioptan 0.0015% ophthalmic solution  -- 1 drop(s) to each affected eye once a day (in the evening)  -- Indication: For eye drops     Cosopt PF 2%-0.5% ophthalmic solution  -- 1 drop(s) to each affected eye 2 times a day  -- Indication: For eye drops     pilocarpine 2% ophthalmic solution  -- 1 drop(s) to each affected eye 3 times a day  -- Indication: For eye drops    cholecalciferol oral tablet  -- 1000 unit(s) by mouth once a day  -- Indication: For Supplement I will START or STAY ON the medications listed below when I get home from the hospital:    aspirin 81 mg oral tablet  -- 1 tab(s) by mouth once a day  -- Indication: For Heart    metoprolol tartrate 25 mg oral tablet  -- 1 tab(s) by mouth 2 times a day  -- Indication: For HTN    nystatin 100,000 units/g topical powder  -- 1 application on skin 2 times a day  -- Indication: For Candida    Zioptan 0.0015% ophthalmic solution  -- 1 drop(s) to each affected eye once a day (in the evening)  -- Indication: For eye drops     Cosopt PF 2%-0.5% ophthalmic solution  -- 1 drop(s) to each affected eye 2 times a day  -- Indication: For eye drops     pilocarpine 2% ophthalmic solution  -- 1 drop(s) to each affected eye 3 times a day  -- Indication: For eye drops    cholecalciferol oral tablet  -- 1000 unit(s) by mouth once a day  -- Indication: For Supplement

## 2019-01-10 NOTE — DISCHARGE NOTE ADULT - PLAN OF CARE
continue current medications Please follow up with your primary care provider within 2 weeks call for an appointment

## 2019-01-10 NOTE — DISCHARGE NOTE ADULT - HOSPITAL COURSE
This is a 89 yo F ADMITTED WITH  syncope. Patient had EKG, CXR,LABS, tele monitoring for arrythmia . Patient had a fever and blood culture performed negative.  UAnegatove.  CT head age appropriate involutional ischemic gliotic changes. No hemmorhages. CXR clear lungs.  Orthostatics negative. RVP pancel negative. ECHO showed_______________________________    PT recommended rehab but patient preferred to go home. This is a 89 yo F ADMITTED WITH  syncope. Patient had EKG, CXR,LABS, tele monitoring for arrythmia . Patient had a fever and blood culture performed negative.  UAnegatove.  CT head age appropriate involutional ischemic gliotic changes. No hemmorhages. CXR clear lungs.  Orthostatics negative. RVP pancel negative. ECHO showed  Mitral annular calcification, otherwise normal mitral  valve. Minimal mitral regurgitation.  2. Normal left ventricular internal dimensions and wall  thicknesses.  3. Endocardium not well visualized; grossly normal left  ventricular systolic function.  4. Normal right ventricular size and function.  5. Estimated right ventricular systolic pressure equals 60  mm Hg, assuming right atrial pressure equals 10 mm Hg,  consistent with moderate pulmonary hypertension.      PT recommended rehab but patient preferred to go home.

## 2019-01-10 NOTE — DISCHARGE NOTE ADULT - PATIENT PORTAL LINK FT
You can access the [facility] Patient Portal, offered by [facility], by registering with the following website: [email or web address]

## 2019-01-10 NOTE — PROGRESS NOTE ADULT - SUBJECTIVE AND OBJECTIVE BOX
Patient is a 89y old  Female who presents with a chief complaint of Episode of unresponsiveness (10 Leonard 2019 10:21)      SUBJECTIVE / OVERNIGHT EVENTS: Only complaint today is L neck stiffness after sleeping in an awkward position.  Denies CP, HA, dizziness, SOB.        MEDICATIONS  (STANDING):  aspirin enteric coated 81 milliGRAM(s) Oral daily  heparin  Injectable 5000 Unit(s) SubCutaneous every 12 hours  metoprolol tartrate 25 milliGRAM(s) Oral two times a day  nystatin Powder 1 Application(s) Topical two times a day        Vital Signs Last 24 Hrs  T(C): 36.4 (10 Leonard 2019 12:42), Max: 37.1 (09 Jan 2019 21:40)  T(F): 97.5 (10 Leonard 2019 12:42), Max: 98.8 (10 Leonard 2019 05:21)  HR: 68 (10 Leonard 2019 12:42) (68 - 82)  BP: 150/92 (10 Leonard 2019 12:42) (140/69 - 153/87)  BP(mean): --  RR: 18 (10 Leonard 2019 12:42) (18 - 18)  SpO2: 96% (10 Leonard 2019 12:42) (96% - 99%)  CAPILLARY BLOOD GLUCOSE      PHYSICAL EXAM  GENERAL: NAD, well-developed  HEAD:  Atraumatic, Normocephalic  EYES: EOMI, PERRLA, conjunctiva and sclera clear  NECK: Supple, No JVD, L neck tenderness with rotation   CHEST/LUNG: Clear to auscultation bilaterally; No wheeze on anterior lung exam   HEART: Regular rate and rhythm; No murmurs, rubs, or gallops  ABDOMEN: Soft, Nontender, Nondistended; Bowel sounds present  EXTREMITIES:  2+ Peripheral Pulses, No clubbing, cyanosis, trace edema b/l LE   PSYCH: AAOx3  SKIN: No rashes or lesions    LABS:                        13.3   13.37 )-----------( 241      ( 09 Jan 2019 07:15 )             41.5     01-09    139  |  103  |  28<H>  ----------------------------<  118<H>  3.7   |  23  |  0.80    Ca    9.3      09 Jan 2019 07:15  Mg     2.1     01-09        RADIOLOGY & ADDITIONAL TESTS:    Imaging Personally Reviewed:  < from: Transthoracic Echocardiogram (01.10.19 @ 13:30) >  CONCLUSIONS:  1. Mitral annular calcification, otherwise normal mitral  valve. Minimal mitral regurgitation.  2. Normal left ventricular internal dimensions and wall  thicknesses.  3. Endocardium not well visualized; grossly normal left  ventricular systolic function.  4.Normal right ventricular size and function.  5. Estimated right ventricular systolic pressure equals 60  mm Hg, assuming right atrial pressure equals 10 mm Hg,  consistent with moderate pulmonary hypertension.    < end of copied text >

## 2019-01-10 NOTE — DISCHARGE NOTE ADULT - CARE PROVIDER_API CALL
Latricia Dalal (MD), Internal Medicine  [address] [address]  Phone: [phone]  Fax: [phone]

## 2019-01-10 NOTE — DISCHARGE NOTE ADULT - DISCHARGE DATE
Patient to take extra folic acid when on depakote at least 2mg daily,avoid pregnancy on depakote
10-Leonard-2019

## 2019-01-10 NOTE — DISCHARGE NOTE ADULT - CARE PLAN
Principal Discharge DX:	Syncope, unspecified syncope type  Goal:	continue current medications  Assessment and plan of treatment:	Please follow up with your primary care provider within 2 weeks call for an appointment

## 2019-01-13 LAB — BACTERIA BLD CULT: SIGNIFICANT CHANGE UP

## 2019-02-09 ENCOUNTER — RX RENEWAL (OUTPATIENT)
Age: 84
End: 2019-02-09

## 2019-03-17 NOTE — PHYSICAL THERAPY INITIAL EVALUATION ADULT - FOLLOWS COMMANDS/ANSWERS QUESTIONS, REHAB EVAL
/80   Pulse 102   Temp 99  F (37.2  C) (Oral)   Resp 16   Wt 64.8 kg (142 lb 14.4 oz)   SpO2 96%   BMI 26.78 kg/m      
75% of the time

## 2019-06-12 DIAGNOSIS — B35.6 TINEA CRURIS: ICD-10-CM

## 2019-06-12 RX ORDER — NYSTATIN 100000 1/G
100000 POWDER TOPICAL
Qty: 1 | Refills: 6 | Status: ACTIVE | COMMUNITY
Start: 2019-06-12 | End: 1900-01-01

## 2019-07-25 DIAGNOSIS — Z74.01 BED CONFINEMENT STATUS: ICD-10-CM

## 2019-07-25 DIAGNOSIS — H35.30 UNSPECIFIED MACULAR DEGENERATION: ICD-10-CM

## 2019-08-20 ENCOUNTER — MEDICATION RENEWAL (OUTPATIENT)
Age: 84
End: 2019-08-20

## 2019-12-30 DIAGNOSIS — M17.0 BILATERAL PRIMARY OSTEOARTHRITIS OF KNEE: ICD-10-CM

## 2019-12-30 DIAGNOSIS — R29.6 REPEATED FALLS: ICD-10-CM

## 2019-12-30 DIAGNOSIS — M21.372 FOOT DROP, LEFT FOOT: ICD-10-CM

## 2019-12-30 DIAGNOSIS — R26.2 DIFFICULTY IN WALKING, NOT ELSEWHERE CLASSIFIED: ICD-10-CM

## 2019-12-30 DIAGNOSIS — M48.061 SPINAL STENOSIS, LUMBAR REGION WITHOUT NEUROGENIC CLAUDICATION: ICD-10-CM

## 2020-01-01 RX ORDER — METOPROLOL TARTRATE 25 MG/1
25 TABLET, FILM COATED ORAL
Qty: 60 | Refills: 5 | Status: ACTIVE | COMMUNITY
Start: 2019-02-09 | End: 1900-01-01

## 2020-12-11 NOTE — H&P ADULT - NSCORESITESY/N_GEN_A_CORE_RD
RECORDS RECEIVED FROM: External   Date of Appt: 1/4/21   NOTES (FOR ALL VISITS) STATUS DETAILS   OFFICE NOTE from referring provider Received Dr Isabelle Howell from [facility]:  12/11/20   OFFICE NOTE from other specialist N/A    DISCHARGE SUMMARY from hospital Care Everywhere [facility]:  7/20/19-7/25/19 6/28/19-7/1/19   DISCHARGE REPORT from the ER N/A    OPERATIVE REPORT N/A    MEDICATION LIST Care Everywhere    IMAGING  (FOR ALL VISITS)     EMG N/A    EEG Care Everywhere [facility]:  6/7/20  10/4/18   LUMBAR PUNCTURE Care Everywhere [facility]:  8/30/19 7/28/19   LOREN SCAN N/A    DEXA SCAN *NEUROSURGERY* N/A    ULTRASOUND (CAROTID BILAT) *VASCULAR* N/A    MRI (HEAD, NECK, SPINE) Received [facility]:  MRI Brain 10/2/18   XRAY (SPINE) *NEUROSURGERY* N/A    CT (HEAD, NECK, SPINE) Received [facility]:  CTA Head Neck 6/5/20  CT Cervical Spine 7/20/19  CT Head 7/20/19      Action 12/11/20 MV 12.48pm   Action Taken Imaging request faxed to [facility] for:  CTA Head Neck 6/5/20  CT Cervical Spine 7/20/19  CT Head 7/20/19  MRI Brain 10/2/18     Action 12/11/20 MV 12.51pm   Action Taken Records request faxed to [facility].     Imaging Received  12/17/20 MV 12.28pm  [facility]   Image Type (x): Disc:   PACS: x   Exam Date/Name CTA Head Neck 6/5/20  CT Cervical Spine 7/20/19  CT Head 7/20/19  MRI Brain 10/2/18 Comments: images resolved in PACS     Action 12/17/20 MV 12.28pm   Action Taken Records received from [facility] via fax. Sent to scanning              No

## 2022-10-26 NOTE — H&P ADULT - PROBLEM SELECTOR PLAN 3
Paula Malin
BUN/Cr: 26/0.85 looks pre-renal   Will hold off on IVF due to elevated blood pressure for now   Will encourage patient to increase PO intake

## 2023-06-08 NOTE — ED ADULT TRIAGE NOTE - PRO INTERPRETER NEED 2
Chart review complete Update obtained from realtime the patient transitioned to LTC at [address] on 6/5/23  I have removed myself from the care team, updated the Care Coordination note, and closed the episode 
English